# Patient Record
Sex: MALE | Race: WHITE | NOT HISPANIC OR LATINO | Employment: OTHER | URBAN - METROPOLITAN AREA
[De-identification: names, ages, dates, MRNs, and addresses within clinical notes are randomized per-mention and may not be internally consistent; named-entity substitution may affect disease eponyms.]

---

## 2022-01-04 ENCOUNTER — LAB VISIT (OUTPATIENT)
Dept: PRIMARY CARE CLINIC | Facility: OTHER | Age: 82
End: 2022-01-04
Attending: INTERNAL MEDICINE

## 2022-01-04 DIAGNOSIS — Z20.822 ENCOUNTER FOR LABORATORY TESTING FOR COVID-19 VIRUS: ICD-10-CM

## 2022-01-04 PROCEDURE — U0003 INFECTIOUS AGENT DETECTION BY NUCLEIC ACID (DNA OR RNA); SEVERE ACUTE RESPIRATORY SYNDROME CORONAVIRUS 2 (SARS-COV-2) (CORONAVIRUS DISEASE [COVID-19]), AMPLIFIED PROBE TECHNIQUE, MAKING USE OF HIGH THROUGHPUT TECHNOLOGIES AS DESCRIBED BY CMS-2020-01-R: HCPCS | Performed by: INTERNAL MEDICINE

## 2022-01-08 DIAGNOSIS — U07.1 COVID-19 VIRUS DETECTED: ICD-10-CM

## 2022-01-08 LAB
SARS-COV-2 RNA RESP QL NAA+PROBE: DETECTED
SARS-COV-2- CYCLE NUMBER: 29

## 2022-01-12 ENCOUNTER — LAB VISIT (OUTPATIENT)
Dept: PRIMARY CARE CLINIC | Facility: OTHER | Age: 82
End: 2022-01-12
Attending: INTERNAL MEDICINE
Payer: COMMERCIAL

## 2022-01-12 DIAGNOSIS — Z20.822 ENCOUNTER FOR LABORATORY TESTING FOR COVID-19 VIRUS: ICD-10-CM

## 2022-01-12 LAB
SARS-COV-2 RNA RESP QL NAA+PROBE: NOT DETECTED
SARS-COV-2- CYCLE NUMBER: NORMAL

## 2022-01-12 PROCEDURE — U0003 INFECTIOUS AGENT DETECTION BY NUCLEIC ACID (DNA OR RNA); SEVERE ACUTE RESPIRATORY SYNDROME CORONAVIRUS 2 (SARS-COV-2) (CORONAVIRUS DISEASE [COVID-19]), AMPLIFIED PROBE TECHNIQUE, MAKING USE OF HIGH THROUGHPUT TECHNOLOGIES AS DESCRIBED BY CMS-2020-01-R: HCPCS | Performed by: INTERNAL MEDICINE

## 2023-04-24 ENCOUNTER — PATIENT MESSAGE (OUTPATIENT)
Dept: CARDIOLOGY | Facility: CLINIC | Age: 83
End: 2023-04-24
Payer: MEDICARE

## 2023-08-31 ENCOUNTER — OFFICE VISIT (OUTPATIENT)
Dept: CARDIOLOGY | Facility: CLINIC | Age: 83
End: 2023-08-31
Payer: MEDICARE

## 2023-08-31 VITALS
OXYGEN SATURATION: 97 % | SYSTOLIC BLOOD PRESSURE: 116 MMHG | HEART RATE: 81 BPM | DIASTOLIC BLOOD PRESSURE: 64 MMHG | WEIGHT: 149 LBS

## 2023-08-31 DIAGNOSIS — Q21.12 PFO (PATENT FORAMEN OVALE): ICD-10-CM

## 2023-08-31 DIAGNOSIS — K21.9 GASTROESOPHAGEAL REFLUX DISEASE WITHOUT ESOPHAGITIS: ICD-10-CM

## 2023-08-31 DIAGNOSIS — R06.02 SOB (SHORTNESS OF BREATH): Primary | ICD-10-CM

## 2023-08-31 DIAGNOSIS — I45.10 RBBB: ICD-10-CM

## 2023-08-31 DIAGNOSIS — I10 PRIMARY HYPERTENSION: ICD-10-CM

## 2023-08-31 PROBLEM — E11.9 DIABETES MELLITUS TYPE II, CONTROLLED: Status: ACTIVE | Noted: 2023-08-31

## 2023-08-31 PROCEDURE — 93005 ELECTROCARDIOGRAM TRACING: CPT

## 2023-08-31 PROCEDURE — 99204 OFFICE O/P NEW MOD 45 MIN: CPT | Mod: S$PBB,,, | Performed by: INTERNAL MEDICINE

## 2023-08-31 PROCEDURE — 93010 EKG 12-LEAD: ICD-10-PCS | Mod: ,,, | Performed by: INTERNAL MEDICINE

## 2023-08-31 PROCEDURE — 99213 OFFICE O/P EST LOW 20 MIN: CPT | Mod: PBBFAC | Performed by: INTERNAL MEDICINE

## 2023-08-31 PROCEDURE — 99204 PR OFFICE/OUTPT VISIT, NEW, LEVL IV, 45-59 MIN: ICD-10-PCS | Mod: S$PBB,,, | Performed by: INTERNAL MEDICINE

## 2023-08-31 PROCEDURE — 93010 ELECTROCARDIOGRAM REPORT: CPT | Mod: ,,, | Performed by: INTERNAL MEDICINE

## 2023-08-31 PROCEDURE — 99999 PR PBB SHADOW E&M-EST. PATIENT-LVL III: ICD-10-PCS | Mod: PBBFAC,,, | Performed by: INTERNAL MEDICINE

## 2023-08-31 PROCEDURE — 99999 PR PBB SHADOW E&M-EST. PATIENT-LVL III: CPT | Mod: PBBFAC,,, | Performed by: INTERNAL MEDICINE

## 2023-08-31 RX ORDER — DOCUSATE SODIUM 100 MG/1
100 CAPSULE, LIQUID FILLED ORAL 2 TIMES DAILY
COMMUNITY

## 2023-08-31 RX ORDER — AMLODIPINE BESYLATE 10 MG/1
10 TABLET ORAL DAILY
COMMUNITY
Start: 2023-08-26

## 2023-08-31 RX ORDER — HYDROCHLOROTHIAZIDE 12.5 MG/1
12.5 CAPSULE ORAL
COMMUNITY

## 2023-08-31 RX ORDER — ASPIRIN 81 MG/1
81 TABLET ORAL DAILY
COMMUNITY

## 2023-08-31 RX ORDER — CARVEDILOL 6.25 MG/1
12.5 TABLET ORAL 2 TIMES DAILY
COMMUNITY
Start: 2023-06-27 | End: 2024-02-15

## 2023-08-31 RX ORDER — ATORVASTATIN CALCIUM 10 MG/1
10 TABLET, FILM COATED ORAL
COMMUNITY

## 2023-08-31 RX ORDER — BISACODYL 5 MG
5 TABLET, DELAYED RELEASE (ENTERIC COATED) ORAL DAILY
COMMUNITY

## 2023-08-31 RX ORDER — METFORMIN HYDROCHLORIDE 1000 MG/1
1000 TABLET ORAL DAILY
COMMUNITY
Start: 2023-08-26

## 2023-08-31 RX ORDER — INSULIN GLARGINE 100 [IU]/ML
INJECTION, SOLUTION SUBCUTANEOUS
COMMUNITY

## 2023-08-31 RX ORDER — FAMOTIDINE 40 MG/1
40 TABLET, FILM COATED ORAL DAILY
COMMUNITY
Start: 2023-08-26

## 2023-08-31 RX ORDER — LOSARTAN POTASSIUM 100 MG/1
100 TABLET ORAL 2 TIMES DAILY
COMMUNITY
Start: 2023-08-26 | End: 2024-02-15 | Stop reason: SDUPTHER

## 2023-08-31 NOTE — PROGRESS NOTES
Cardiology    8/31/2023  2:07 PM    Problem list  Patient Active Problem List   Diagnosis    Diabetes mellitus type II, controlled    PFO (patent foramen ovale)    Primary hypertension    SOB (shortness of breath)    Gastroesophageal reflux disease without esophagitis    RBBB       CC:  Follow-up    HPI:  Patient wanted to establish care.  Patient last saw cardiologist, Dr Lundberg at [facility] in 2017.  Patient has a history of PFO.  He has history of hypertension, diabetes.  He is still working 2 days a week.  He is active.  Any exertional symptoms.  He occasionally gets dizzy.  He only takes hydrochlorothiazide as needed for swelling or elevated blood pressure.  He states that his systolic blood pressure is 130s at home.    Medications  Current Outpatient Medications   Medication Sig Dispense Refill    amLODIPine (NORVASC) 10 MG tablet Take 10 mg by mouth once daily.      aspirin (ECOTRIN) 81 MG EC tablet 81 mg once daily.      atorvastatin (LIPITOR) 10 MG tablet 10 mg. Mon, Wed and Fri      bisacodyL (DULCOLAX, BISACODYL,) 5 mg EC tablet 5 mg once daily.      carvediloL (COREG) 6.25 MG tablet Take 6.25 mg by mouth 2 (two) times daily.      docusate sodium (COLACE) 100 MG capsule 100 mg 2 (two) times a day.      famotidine (PEPCID) 40 MG tablet Take 40 mg by mouth once daily.      hydroCHLOROthiazide (MICROZIDE) 12.5 mg capsule 12.5 mg as needed.      insulin (BASAGLAR KWIKPEN U-100 INSULIN) glargine 100 units/mL SubQ pen       losartan (COZAAR) 100 MG tablet Take 100 mg by mouth once daily.      metFORMIN (GLUCOPHAGE) 1000 MG tablet Take 1,000 mg by mouth once daily.      SITagliptin phosphate (JANUVIA) 100 MG Tab 100 mg once daily.       No current facility-administered medications for this visit.      Prior to Admission medications    Medication Sig Start Date End Date Taking? Authorizing Provider   amLODIPine (NORVASC) 10 MG tablet Take 10 mg by mouth once daily. 8/26/23  Yes Provider, Historical    aspirin (ECOTRIN) 81 MG EC tablet 81 mg once daily.   Yes Provider, Historical   atorvastatin (LIPITOR) 10 MG tablet 10 mg. Mon, Wed and Fri   Yes Provider, Historical   bisacodyL (DULCOLAX, BISACODYL,) 5 mg EC tablet 5 mg once daily.   Yes Provider, Historical   carvediloL (COREG) 6.25 MG tablet Take 6.25 mg by mouth 2 (two) times daily. 6/27/23  Yes Provider, Historical   docusate sodium (COLACE) 100 MG capsule 100 mg 2 (two) times a day.   Yes Provider, Historical   famotidine (PEPCID) 40 MG tablet Take 40 mg by mouth once daily. 8/26/23  Yes Provider, Historical   hydroCHLOROthiazide (MICROZIDE) 12.5 mg capsule 12.5 mg as needed.   Yes Provider, Historical   insulin (BASAGLAR KWIKPEN U-100 INSULIN) glargine 100 units/mL SubQ pen    Yes Provider, Historical   losartan (COZAAR) 100 MG tablet Take 100 mg by mouth once daily. 8/26/23  Yes Provider, Historical   metFORMIN (GLUCOPHAGE) 1000 MG tablet Take 1,000 mg by mouth once daily. 8/26/23  Yes Provider, Historical   SITagliptin phosphate (JANUVIA) 100 MG Tab 100 mg once daily.   Yes Provider, Historical         History  No past medical history on file.  No past surgical history on file.  Social History     Socioeconomic History    Marital status:    Tobacco Use    Smoking status: Never    Smokeless tobacco: Never   Substance and Sexual Activity    Alcohol use: Yes     Comment: occ    Drug use: Never         Allergies  Review of patient's allergies indicates:   Allergen Reactions    Ancef [cefazolin]     Cephalosporins     Flomax [tamsulosin]      wheezing    Valsartan      Headache           Review of Systems   Review of Systems   Constitutional: Negative for decreased appetite, fever and weight loss.   HENT:  Negative for congestion and nosebleeds.    Eyes:  Negative for double vision, vision loss in left eye, vision loss in right eye and visual disturbance.   Cardiovascular:  Negative for chest pain, claudication, cyanosis, dyspnea on exertion,  irregular heartbeat, leg swelling, near-syncope, orthopnea, palpitations, paroxysmal nocturnal dyspnea and syncope.   Respiratory:  Negative for cough, hemoptysis, shortness of breath, sleep disturbances due to breathing, snoring, sputum production and wheezing.    Endocrine: Negative for cold intolerance and heat intolerance.   Skin:  Negative for nail changes and rash.   Musculoskeletal:  Negative for joint pain, muscle cramps, muscle weakness and myalgias.   Gastrointestinal:  Negative for change in bowel habit, heartburn, hematemesis, hematochezia, hemorrhoids and melena.   Neurological:  Negative for dizziness, focal weakness and headaches.         Physical Exam  Wt Readings from Last 1 Encounters:   08/31/23 67.6 kg (149 lb)     BP Readings from Last 3 Encounters:   08/31/23 116/64     Pulse Readings from Last 1 Encounters:   08/31/23 81     There is no height or weight on file to calculate BMI.    Physical Exam  Vitals reviewed.   Constitutional:       Appearance: He is well-developed.   HENT:      Head: Atraumatic.   Eyes:      General: No scleral icterus.  Neck:      Vascular: Normal carotid pulses. No carotid bruit, hepatojugular reflux or JVD.   Cardiovascular:      Rate and Rhythm: Normal rate and regular rhythm.      Chest Wall: PMI is not displaced.      Pulses: Intact distal pulses.           Carotid pulses are 2+ on the right side and 2+ on the left side.       Radial pulses are 2+ on the right side and 2+ on the left side.        Dorsalis pedis pulses are 2+ on the right side and 2+ on the left side.      Heart sounds: Normal heart sounds, S1 normal and S2 normal. No murmur heard.     No friction rub.   Pulmonary:      Effort: Pulmonary effort is normal. No respiratory distress.      Breath sounds: Normal breath sounds. No stridor. No wheezing or rales.   Chest:      Chest wall: No tenderness.   Abdominal:      General: Bowel sounds are normal.      Palpations: Abdomen is soft.   Musculoskeletal:       Cervical back: Neck supple. No edema.   Skin:     General: Skin is warm and dry.      Nails: There is no clubbing.   Neurological:      Mental Status: He is alert and oriented to person, place, and time.   Psychiatric:         Behavior: Behavior normal.         Thought Content: Thought content normal.             Assessment  1. SOB (shortness of breath)  Controlled w inhaler    2. Primary hypertension  Controlled on meds, continue meds and monitor    3. PFO (patent foramen ovale)  stable    4. Gastroesophageal reflux disease without esophagitis  stable    5. RBBB  Unchanged, chronic since 2016        Plan and Discussion  Discussed his EKG today which showed normal sinus rhythm rate of 71 with right bundle branch block and LVH.  His orthostatic BP is negative today.  Given h/o of PFO, will get an echo to assess.    Follow Up  TBD      Noe Olivia MD, F.A.C.C, F.S.C.A.I.        40 minutes were spent in chart review, documentation and review of results, and evaluation, treatment, and counseling of patient on the same day of service.    Disclaimer: This document was created using voice recognition software ([employer] Fluency Direct). Although it may be edited, this document may contain errors related to incorrect recognition of the spoken word. Please call the physician if clarification is needed.

## 2023-10-16 ENCOUNTER — HOSPITAL ENCOUNTER (OUTPATIENT)
Dept: CARDIOLOGY | Facility: OTHER | Age: 83
Discharge: HOME | End: 2023-10-16
Attending: INTERNAL MEDICINE
Payer: MEDICARE

## 2023-10-16 VITALS
BODY MASS INDEX: 22.07 KG/M2 | SYSTOLIC BLOOD PRESSURE: 116 MMHG | HEART RATE: 81 BPM | DIASTOLIC BLOOD PRESSURE: 64 MMHG | HEIGHT: 69 IN | WEIGHT: 149 LBS

## 2023-10-16 DIAGNOSIS — I45.10 RBBB: ICD-10-CM

## 2023-10-16 LAB
ASCENDING AORTA: 3.19 CM
AV INDEX (PROSTH): 0.77
AV MEAN GRADIENT: 4 MMHG
AV PEAK GRADIENT: 7 MMHG
AV REGURGITATION PRESSURE HALF TIME: 537 MS
AV VALVE AREA BY VELOCITY RATIO: 2.45 CM²
AV VALVE AREA: 2.43 CM²
AV VELOCITY RATIO: 0.78
BSA FOR ECHO PROCEDURE: 1.81 M2
CV ECHO LV RWT: 0.41 CM
DOP CALC AO PEAK VEL: 1.36 M/S
DOP CALC AO VTI: 27.5 CM
DOP CALC LVOT AREA: 3.1 CM2
DOP CALC LVOT DIAMETER: 2 CM
DOP CALC LVOT PEAK VEL: 1.06 M/S
DOP CALC LVOT STROKE VOLUME: 66.88 CM3
DOP CALC RVOT PEAK VEL: 0.72 M/S
DOP CALC RVOT VTI: 12.5 CM
DOP CALCLVOT PEAK VEL VTI: 21.3 CM
E WAVE DECELERATION TIME: 314.82 MSEC
E/A RATIO: 0.54
E/E' RATIO: 7.85 M/S
ECHO LV POSTERIOR WALL: 1.01 CM (ref 0.6–1.1)
FRACTIONAL SHORTENING: 22 % (ref 28–44)
INTERVENTRICULAR SEPTUM: 1.05 CM (ref 0.6–1.1)
IVC DIAMETER: 1.16 CM
IVRT: 133.21 MSEC
LA MAJOR: 5.07 CM
LA MINOR: 5.52 CM
LA WIDTH: 3.3 CM
LEFT ATRIUM SIZE: 3.42 CM
LEFT ATRIUM VOLUME INDEX MOD: 27.7 ML/M2
LEFT ATRIUM VOLUME INDEX: 27.9 ML/M2
LEFT ATRIUM VOLUME MOD: 50.38 CM3
LEFT ATRIUM VOLUME: 50.7 CM3
LEFT INTERNAL DIMENSION IN SYSTOLE: 3.79 CM (ref 2.1–4)
LEFT VENTRICLE DIASTOLIC VOLUME INDEX: 61.31 ML/M2
LEFT VENTRICLE DIASTOLIC VOLUME: 111.59 ML
LEFT VENTRICLE MASS INDEX: 100 G/M2
LEFT VENTRICLE SYSTOLIC VOLUME INDEX: 33.8 ML/M2
LEFT VENTRICLE SYSTOLIC VOLUME: 61.44 ML
LEFT VENTRICULAR INTERNAL DIMENSION IN DIASTOLE: 4.88 CM (ref 3.5–6)
LEFT VENTRICULAR MASS: 182.01 G
LV LATERAL E/E' RATIO: 6.38 M/S
LV SEPTAL E/E' RATIO: 10.2 M/S
LVOT MG: 2.26 MMHG
LVOT MV: 0.71 CM/S
MV PEAK A VEL: 0.94 M/S
MV PEAK E VEL: 0.51 M/S
MV STENOSIS PRESSURE HALF TIME: 91.3 MS
MV VALVE AREA P 1/2 METHOD: 2.41 CM2
PISA AR MAX VEL: 4.15 M/S
PISA MRMAX VEL: 4.94 M/S
PISA TR MAX VEL: 2.09 M/S
PULM VEIN S/D RATIO: 1.81
PV MEAN GRADIENT: 1 MMHG
PV PEAK D VEL: 0.27 M/S
PV PEAK GRADIENT: 3 MMHG
PV PEAK S VEL: 0.49 M/S
PV PEAK VELOCITY: 0.9 M/S
RA MAJOR: 4.44 CM
RA WIDTH: 3.7 CM
RIGHT VENTRICULAR END-DIASTOLIC DIMENSION: 3.1 CM
RV TISSUE DOPPLER FREE WALL SYSTOLIC VELOCITY 1 (APICAL 4 CHAMBER VIEW): 14.11 CM/S
SINUS: 3.4 CM
STJ: 3.11 CM
TDI LATERAL: 0.08 M/S
TDI SEPTAL: 0.05 M/S
TDI: 0.07 M/S
TR MAX PG: 17 MMHG
TRICUSPID ANNULAR PLANE SYSTOLIC EXCURSION: 1.8 CM
Z-SCORE OF LEFT VENTRICULAR DIMENSION IN END DIASTOLE: -0.32
Z-SCORE OF LEFT VENTRICULAR DIMENSION IN END SYSTOLE: 1.56

## 2023-10-16 PROCEDURE — 93306 TTE W/DOPPLER COMPLETE: CPT | Mod: 26,,, | Performed by: INTERNAL MEDICINE

## 2023-10-16 PROCEDURE — 93306 TTE W/DOPPLER COMPLETE: CPT

## 2023-10-16 PROCEDURE — 93306 ECHO (CUPID ONLY): ICD-10-PCS | Mod: 26,,, | Performed by: INTERNAL MEDICINE

## 2023-10-27 ENCOUNTER — PATIENT MESSAGE (OUTPATIENT)
Dept: CARDIOLOGY | Facility: CLINIC | Age: 83
End: 2023-10-27
Payer: MEDICARE

## 2024-02-15 ENCOUNTER — OFFICE VISIT (OUTPATIENT)
Dept: CARDIOLOGY | Facility: CLINIC | Age: 84
End: 2024-02-15
Payer: MEDICARE

## 2024-02-15 VITALS
BODY MASS INDEX: 21.12 KG/M2 | SYSTOLIC BLOOD PRESSURE: 102 MMHG | DIASTOLIC BLOOD PRESSURE: 60 MMHG | OXYGEN SATURATION: 98 % | WEIGHT: 143 LBS | HEART RATE: 72 BPM

## 2024-02-15 DIAGNOSIS — I10 PRIMARY HYPERTENSION: Primary | ICD-10-CM

## 2024-02-15 DIAGNOSIS — Q21.12 PFO (PATENT FORAMEN OVALE): ICD-10-CM

## 2024-02-15 DIAGNOSIS — I45.10 RBBB: ICD-10-CM

## 2024-02-15 PROCEDURE — 99999 PR PBB SHADOW E&M-EST. PATIENT-LVL III: CPT | Mod: PBBFAC,,, | Performed by: INTERNAL MEDICINE

## 2024-02-15 PROCEDURE — 99213 OFFICE O/P EST LOW 20 MIN: CPT | Mod: PBBFAC | Performed by: INTERNAL MEDICINE

## 2024-02-15 PROCEDURE — 99214 OFFICE O/P EST MOD 30 MIN: CPT | Mod: S$PBB,,, | Performed by: INTERNAL MEDICINE

## 2024-02-15 RX ORDER — LOSARTAN POTASSIUM 100 MG/1
100 TABLET ORAL DAILY
Qty: 90 TABLET | Refills: 3 | Status: SHIPPED | OUTPATIENT
Start: 2024-02-15

## 2024-02-15 RX ORDER — CARVEDILOL 12.5 MG/1
12.5 TABLET ORAL 2 TIMES DAILY WITH MEALS
Qty: 180 TABLET | Refills: 3 | Status: SHIPPED | OUTPATIENT
Start: 2024-02-15 | End: 2025-02-14

## 2024-02-15 NOTE — PROGRESS NOTES
Cardiology    2/15/2024  11:44 AM    Problem list  Patient Active Problem List   Diagnosis    Diabetes mellitus type II, controlled    PFO (patent foramen ovale)    Primary hypertension    SOB (shortness of breath)    Gastroesophageal reflux disease without esophagitis    RBBB       CC:  Follow-up    HPI:  He has no complaints.  He denies any chest pain or shortness of breath.  He has been monitoring his blood pressure at home and noticed that his systolic has been elevated to 140s 150s so he decided to increase carvedilol to 12.5 mg twice daily and losartan 100 mg twice daily.    Medications  Current Outpatient Medications   Medication Sig Dispense Refill    amLODIPine (NORVASC) 10 MG tablet Take 10 mg by mouth once daily.      aspirin (ECOTRIN) 81 MG EC tablet 81 mg once daily.      atorvastatin (LIPITOR) 10 MG tablet 10 mg. Mon and Fri      bisacodyL (DULCOLAX, BISACODYL,) 5 mg EC tablet 5 mg once daily.      docusate sodium (COLACE) 100 MG capsule 100 mg 2 (two) times a day.      famotidine (PEPCID) 40 MG tablet Take 40 mg by mouth once daily.      hydroCHLOROthiazide (MICROZIDE) 12.5 mg capsule 12.5 mg as needed.      insulin (BASAGLAR KWIKPEN U-100 INSULIN) glargine 100 units/mL SubQ pen       metFORMIN (GLUCOPHAGE) 1000 MG tablet Take 1,000 mg by mouth once daily.      SITagliptin phosphate (JANUVIA) 100 MG Tab 100 mg once daily.      carvediloL (COREG) 12.5 MG tablet Take 1 tablet (12.5 mg total) by mouth 2 (two) times daily with meals. 180 tablet 3    losartan (COZAAR) 100 MG tablet Take 1 tablet (100 mg total) by mouth once daily. 90 tablet 3     No current facility-administered medications for this visit.      Prior to Admission medications    Medication Sig Start Date End Date Taking? Authorizing Provider   amLODIPine (NORVASC) 10 MG tablet Take 10 mg by mouth once daily. 8/26/23  Yes Provider, Historical   aspirin (ECOTRIN) 81 MG EC tablet 81 mg once daily.   Yes Provider, Historical    atorvastatin (LIPITOR) 10 MG tablet 10 mg. Mon and Fri   Yes Provider, Historical   bisacodyL (DULCOLAX, BISACODYL,) 5 mg EC tablet 5 mg once daily.   Yes Provider, Historical   docusate sodium (COLACE) 100 MG capsule 100 mg 2 (two) times a day.   Yes Provider, Historical   famotidine (PEPCID) 40 MG tablet Take 40 mg by mouth once daily. 8/26/23  Yes Provider, Historical   hydroCHLOROthiazide (MICROZIDE) 12.5 mg capsule 12.5 mg as needed.   Yes Provider, Historical   insulin (BASAGLAR KWIKPEN U-100 INSULIN) glargine 100 units/mL SubQ pen    Yes Provider, Historical   metFORMIN (GLUCOPHAGE) 1000 MG tablet Take 1,000 mg by mouth once daily. 8/26/23  Yes Provider, Historical   SITagliptin phosphate (JANUVIA) 100 MG Tab 100 mg once daily.   Yes Provider, Historical   carvediloL (COREG) 6.25 MG tablet Take 12.5 mg by mouth 2 (two) times daily. 6/27/23 2/15/24 Yes Provider, Historical   losartan (COZAAR) 100 MG tablet Take 100 mg by mouth 2 (two) times a day. 8/26/23 2/15/24 Yes Provider, Historical   carvediloL (COREG) 12.5 MG tablet Take 1 tablet (12.5 mg total) by mouth 2 (two) times daily with meals. 2/15/24 2/14/25  Noe Olivia MD   losartan (COZAAR) 100 MG tablet Take 1 tablet (100 mg total) by mouth once daily. 2/15/24   Noe Olivia MD         History  No past medical history on file.  No past surgical history on file.  Social History     Socioeconomic History    Marital status:    Tobacco Use    Smoking status: Never    Smokeless tobacco: Never   Substance and Sexual Activity    Alcohol use: Yes     Comment: occ    Drug use: Never         Allergies  Review of patient's allergies indicates:   Allergen Reactions    Ancef [cefazolin]     Cephalosporins     Flomax [tamsulosin]      wheezing    Valsartan      Headache           Review of Systems   Review of Systems   Constitutional: Negative for decreased appetite, fever and weight loss.   HENT:  Negative for congestion and nosebleeds.    Eyes:   Negative for double vision, vision loss in left eye, vision loss in right eye and visual disturbance.   Cardiovascular:  Negative for chest pain, claudication, cyanosis, dyspnea on exertion, irregular heartbeat, leg swelling, near-syncope, orthopnea, palpitations, paroxysmal nocturnal dyspnea and syncope.   Respiratory:  Negative for cough, hemoptysis, shortness of breath, sleep disturbances due to breathing, snoring, sputum production and wheezing.    Endocrine: Negative for cold intolerance and heat intolerance.   Skin:  Negative for nail changes and rash.   Musculoskeletal:  Negative for joint pain, muscle cramps, muscle weakness and myalgias.   Gastrointestinal:  Negative for change in bowel habit, heartburn, hematemesis, hematochezia, hemorrhoids and melena.   Neurological:  Negative for dizziness, focal weakness and headaches.         Physical Exam  Wt Readings from Last 1 Encounters:   02/15/24 64.9 kg (143 lb)     BP Readings from Last 3 Encounters:   02/15/24 102/60   10/16/23 116/64   08/31/23 116/64     Pulse Readings from Last 1 Encounters:   02/15/24 72     Body mass index is 21.12 kg/m².    Physical Exam  Vitals reviewed.   Constitutional:       Appearance: He is well-developed.   HENT:      Head: Atraumatic.   Eyes:      General: No scleral icterus.  Neck:      Vascular: Normal carotid pulses. No carotid bruit, hepatojugular reflux or JVD.   Cardiovascular:      Rate and Rhythm: Normal rate and regular rhythm.      Chest Wall: PMI is not displaced.      Pulses: Intact distal pulses.           Carotid pulses are 2+ on the right side and 2+ on the left side.       Radial pulses are 2+ on the right side and 2+ on the left side.        Dorsalis pedis pulses are 2+ on the right side and 2+ on the left side.      Heart sounds: Normal heart sounds, S1 normal and S2 normal. No murmur heard.     No friction rub.   Pulmonary:      Effort: Pulmonary effort is normal. No respiratory distress.      Breath sounds:  Normal breath sounds. No stridor. No wheezing or rales.   Chest:      Chest wall: No tenderness.   Abdominal:      General: Bowel sounds are normal.      Palpations: Abdomen is soft.   Musculoskeletal:      Cervical back: Neck supple. No edema.   Skin:     General: Skin is warm and dry.      Nails: There is no clubbing.   Neurological:      Mental Status: He is alert and oriented to person, place, and time.   Psychiatric:         Behavior: Behavior normal.         Thought Content: Thought content normal.             Assessment  1. Primary hypertension  Well controlled    2. RBBB  Stable    3. PFO (patent foramen ovale)  Stable        Plan and Discussion  Discussed that his blood pressure is well controlled.  However he should only take losartan 100 mg once daily.  He can continue carvedilol 12.5 mg twice daily.  He can continue amlodipine.  Refills for losartan carvedilol sent.  He will bring his blood pressure cuff in for us to validate.    Follow Up  Six-month      Noe Olivia MD, F.A.C.C, F.S.C.A.I.      Total professional time spent for the encounter: 30 minutes  Time was spent preparing to see the patient, reviewing results of prior testing, obtaining and/or reviewing separately obtained history, performing a medically appropriate examination and interview, counseling and educating the patient/family, ordering medications/tests/procedures, referring and communicating with other health care professionals, documenting clinical information in the electronic health record, and independently interpreting results.    Disclaimer: This document was created using voice recognition software ([employer] Fluency Direct). Although it may be edited, this document may contain errors related to incorrect recognition of the spoken word. Please call the physician if clarification is needed.

## 2024-02-22 ENCOUNTER — TELEPHONE (OUTPATIENT)
Dept: CARDIOLOGY | Facility: CLINIC | Age: 84
End: 2024-02-22
Payer: MEDICARE

## 2024-02-22 NOTE — TELEPHONE ENCOUNTER
----- Message from Amelia Abigailsania Steele sent at 2/22/2024  2:13 PM CST -----  Regarding: call back  Type: Patient Call Back    Who called: pt    What is the request in detail: requesting provider to do consult with endocrinologist Krista Rodríguez, fax number [phone]    Can the clinic reply by [facility]?no    Would the patient rather a call back or a response via [facility]? call    Best call back number: [phone]     Additional Information:

## 2024-03-06 ENCOUNTER — PATIENT MESSAGE (OUTPATIENT)
Dept: CARDIOLOGY | Facility: CLINIC | Age: 84
End: 2024-03-06
Payer: MEDICARE

## 2024-08-02 DIAGNOSIS — R80.9 ALBUMINURIA: Primary | ICD-10-CM

## 2024-08-07 ENCOUNTER — LAB VISIT (OUTPATIENT)
Dept: LAB | Facility: HOSPITAL | Age: 84
End: 2024-08-07
Payer: MEDICARE

## 2024-08-07 DIAGNOSIS — R80.9 ALBUMINURIA: ICD-10-CM

## 2024-08-07 LAB
ALBUMIN SERPL BCP-MCNC: 3.6 G/DL (ref 3.5–5.2)
ANION GAP SERPL CALC-SCNC: 7 MMOL/L (ref 8–16)
BASOPHILS # BLD AUTO: 0.1 K/UL (ref 0–0.2)
BASOPHILS NFR BLD: 1 % (ref 0–1.9)
BUN SERPL-MCNC: 15 MG/DL (ref 8–23)
CALCIUM SERPL-MCNC: 9 MG/DL (ref 8.7–10.5)
CHLORIDE SERPL-SCNC: 104 MMOL/L (ref 95–110)
CO2 SERPL-SCNC: 23 MMOL/L (ref 23–29)
CREAT SERPL-MCNC: 1.2 MG/DL (ref 0.5–1.4)
DIFFERENTIAL METHOD BLD: ABNORMAL
EOSINOPHIL # BLD AUTO: 0.7 K/UL (ref 0–0.5)
EOSINOPHIL NFR BLD: 7.8 % (ref 0–8)
ERYTHROCYTE [DISTWIDTH] IN BLOOD BY AUTOMATED COUNT: 14.3 % (ref 11.5–14.5)
EST. GFR  (NO RACE VARIABLE): 59.6 ML/MIN/1.73 M^2
GLUCOSE SERPL-MCNC: 167 MG/DL (ref 70–110)
HCT VFR BLD AUTO: 39.8 % (ref 40–54)
HGB BLD-MCNC: 12.6 G/DL (ref 14–18)
IMM GRANULOCYTES # BLD AUTO: 0.03 K/UL (ref 0–0.04)
IMM GRANULOCYTES NFR BLD AUTO: 0.3 % (ref 0–0.5)
LYMPHOCYTES # BLD AUTO: 2 K/UL (ref 1–4.8)
LYMPHOCYTES NFR BLD: 21.4 % (ref 18–48)
MCH RBC QN AUTO: 26.9 PG (ref 27–31)
MCHC RBC AUTO-ENTMCNC: 31.7 G/DL (ref 32–36)
MCV RBC AUTO: 85 FL (ref 82–98)
MONOCYTES # BLD AUTO: 1 K/UL (ref 0.3–1)
MONOCYTES NFR BLD: 10.8 % (ref 4–15)
NEUTROPHILS # BLD AUTO: 5.6 K/UL (ref 1.8–7.7)
NEUTROPHILS NFR BLD: 58.7 % (ref 38–73)
NRBC BLD-RTO: 0 /100 WBC
PHOSPHATE SERPL-MCNC: 3.7 MG/DL (ref 2.7–4.5)
PLATELET # BLD AUTO: 225 K/UL (ref 150–450)
PMV BLD AUTO: 9.9 FL (ref 9.2–12.9)
POTASSIUM SERPL-SCNC: 4.4 MMOL/L (ref 3.5–5.1)
PTH-INTACT SERPL-MCNC: 51.8 PG/ML (ref 9–77)
RBC # BLD AUTO: 4.68 M/UL (ref 4.6–6.2)
SODIUM SERPL-SCNC: 134 MMOL/L (ref 136–145)
URATE SERPL-MCNC: 4.1 MG/DL (ref 3.4–7)
WBC # BLD AUTO: 9.53 K/UL (ref 3.9–12.7)

## 2024-08-07 PROCEDURE — 84550 ASSAY OF BLOOD/URIC ACID: CPT | Performed by: INTERNAL MEDICINE

## 2024-08-07 PROCEDURE — 85025 COMPLETE CBC W/AUTO DIFF WBC: CPT | Performed by: INTERNAL MEDICINE

## 2024-08-07 PROCEDURE — 83970 ASSAY OF PARATHORMONE: CPT | Performed by: INTERNAL MEDICINE

## 2024-08-07 PROCEDURE — 36415 COLL VENOUS BLD VENIPUNCTURE: CPT | Mod: PO | Performed by: INTERNAL MEDICINE

## 2024-08-07 PROCEDURE — 80069 RENAL FUNCTION PANEL: CPT | Performed by: INTERNAL MEDICINE

## 2024-08-14 NOTE — PROGRESS NOTES
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 Prior labs at [facility]. His baseline Cr appears to be 0.9-1.0 with GFR > 60%; last at baseline 3/2024. He first noted microalbuminuria in 1/2024. ACR 39 on 1/12/24 and ACR 43 in 4/11/24.   Labs also with intermittent hyponatremia since at least 2016; Na 134 today.     He was diagnosed with T2DM in the 1990s. Last A1c was 7.7. Tracks his glucose levels which range from 50-upper 200s. He notes nocturnal hypoglycemia. He has never been on a SGLT2i.   He reports good water intake; 2-3 bottles per day. He is prescribed HCTZ PRN for edema, but hasn't taken in several months. No edema on exam.         Past Medical History:   Diagnosis Date    HTN (hypertension)     Patent foramen ovale     T2DM (type 2 diabetes mellitus)        Past Surgical History:   Procedure Laterality Date    OPEN REDUCTION AND INTERNAL FIXATION (ORIF) OF INJURY OF SHOULDER  2016       No family history on file.    Social History     Tobacco Use    Smoking status: Never    Smokeless tobacco: Never   Substance Use Topics    Alcohol use: Yes     Comment: occ    Drug use: Never         ROS:  Complete ROS otherwise negative except as indicated above.         Current Outpatient Medications:     amLODIPine (NORVASC) 10 MG tablet, Take 10 mg by mouth once daily., Disp: , Rfl:     aspirin (ECOTRIN) 81 MG EC tablet, 81 mg once daily., Disp: , Rfl:     atorvastatin (LIPITOR) 10 MG tablet, 10 mg. Mon and Fri, Disp: , Rfl:     bisacodyL (DULCOLAX, BISACODYL,) 5 mg EC tablet, 5 mg once daily., Disp: , Rfl:     carvediloL (COREG) 12.5 MG tablet, Take 1 tablet (12.5 mg total) by mouth 2 (two) times daily with meals., Disp: 180 tablet,  "Rfl: 3    docusate sodium (COLACE) 100 MG capsule, 100 mg 2 (two) times a day., Disp: , Rfl:     ergocalciferol (ERGOCALCIFEROL) 50,000 unit Cap, Take 50,000 Units by mouth every 7 days., Disp: , Rfl:     famotidine (PEPCID) 40 MG tablet, Take 40 mg by mouth once daily., Disp: , Rfl:     insulin (BASAGLAR KWIKPEN U-100 INSULIN) glargine 100 units/mL SubQ pen, , Disp: , Rfl:     losartan (COZAAR) 100 MG tablet, Take 1 tablet (100 mg total) by mouth once daily., Disp: 90 tablet, Rfl: 3    metFORMIN (GLUCOPHAGE) 1000 MG tablet, Take 1,000 mg by mouth once daily., Disp: , Rfl:     SITagliptin phosphate (JANUVIA) 100 MG Tab, 100 mg once daily., Disp: , Rfl:       Vitals: Blood pressure 110/72, pulse 81, resp. rate 18, height 5' 9" (1.753 m), weight 64.4 kg (141 lb 15.6 oz), SpO2 97%. Body mass index is 20.97 kg/m².    Physical Exam  Vitals reviewed.   Constitutional:       General: He is awake. He is not in acute distress.     Appearance: Normal appearance. He is well-developed.   HENT:      Head: Normocephalic and atraumatic.      Nose: Nose normal.      Mouth/Throat:      Mouth: Mucous membranes are moist.   Eyes:      Extraocular Movements: Extraocular movements intact.      Conjunctiva/sclera: Conjunctivae normal.   Pulmonary:      Effort: Pulmonary effort is normal.   Musculoskeletal:         General: No tenderness or signs of injury.      Right lower leg: No edema.      Left lower leg: No edema.   Skin:     General: Skin is warm and dry.      Findings: No erythema or rash.   Neurological:      General: No focal deficit present.      Mental Status: He is alert. Mental status is at baseline.   Psychiatric:         Mood and Affect: Mood normal.         Behavior: Behavior normal.           Labs/Imaging:  Sodium   Date Value Ref Range Status   08/07/2024 134 (L) 136 - 145 mmol/L Final     Potassium   Date Value Ref Range Status   08/07/2024 4.4 3.5 - 5.1 mmol/L Final     Chloride   Date Value Ref Range Status "   08/07/2024 104 95 - 110 mmol/L Final     CO2   Date Value Ref Range Status   08/07/2024 23 23 - 29 mmol/L Final     BUN   Date Value Ref Range Status   08/07/2024 15 8 - 23 mg/dL Final     Creatinine   Date Value Ref Range Status   08/07/2024 1.2 0.5 - 1.4 mg/dL Final     eGFR   Date Value Ref Range Status   08/07/2024 59.6 (A) >60 mL/min/1.73 m^2 Final     Calcium   Date Value Ref Range Status   08/07/2024 9.0 8.7 - 10.5 mg/dL Final     Phosphorus   Date Value Ref Range Status   08/07/2024 3.7 2.7 - 4.5 mg/dL Final     Albumin   Date Value Ref Range Status   08/07/2024 3.6 3.5 - 5.2 g/dL Final       PTH, Intact   Date Value Ref Range Status   08/07/2024 51.8 9.0 - 77.0 pg/mL Final     Uric Acid   Date Value Ref Range Status   08/07/2024 4.1 3.4 - 7.0 mg/dL Final       Hemoglobin   Date Value Ref Range Status   08/07/2024 12.6 (L) 14.0 - 18.0 g/dL Final       Prot/Creat Ratio, Urine   Date Value Ref Range Status   08/07/2024 0.16 0.00 - 0.20 Final       4/11/24: ACR 43  1/12/24: Cr 0.94, GFR 80, Na 139. A1c 7.7. ACR 39.   3/2/23: Cr 1.01, GFR 74%        Renal US: ordered       Impression/Plan:    Diabetic nephropathy associated with type 2 diabetes mellitus  - ACR 39 in 1/2024 --> 43 in 4/2024; due to diabetic nephropathy  - UPCR negative; controlled  - continue max dose losartan   - considered adding SGLT2i to regimen, but concerned about nocturnal hypoglycemia. Will defer to PCP  - discussed importance of keeping A1c controlled.     Stage 3a chronic kidney disease  - baseline Cr 0.9-1.0 with GFR 70-80s; due to diabetic nephropathy  - Cr 1.2 today; mildly above baseline.   - recommended increasing water intake. Work on improving A1c. Avoid NSAIDs  - ordered renal US    Hyponatremia  - intermittently present since 2016  - Na 134 today; stable  - patient to start keeping track of fluid intake so that we can adjust at next visit  - ordered SOsm, UOsm, Haile  - requested f/u in 3 months    Anemia  - hgb 12.6; at goal  for CKD. Will trend.       Visit today included increased complexity associated with the care of the episodic problem diabetic nephropathy addressed and managing the longitudinal care of the patient due to the serious and/or complex managed problem(s) CKD.      Treatment options and plan were discussed with the patient and/or caregiver.   RTC 3-4 months.       Emili Torres MD  [facility]  [phone]

## 2024-08-15 ENCOUNTER — OFFICE VISIT (OUTPATIENT)
Dept: NEPHROLOGY | Facility: CLINIC | Age: 84
End: 2024-08-15
Payer: MEDICARE

## 2024-08-15 VITALS
RESPIRATION RATE: 18 BRPM | DIASTOLIC BLOOD PRESSURE: 72 MMHG | SYSTOLIC BLOOD PRESSURE: 110 MMHG | WEIGHT: 142 LBS | BODY MASS INDEX: 21.03 KG/M2 | HEART RATE: 81 BPM | OXYGEN SATURATION: 97 % | HEIGHT: 69 IN

## 2024-08-15 DIAGNOSIS — E11.21 DIABETIC NEPHROPATHY ASSOCIATED WITH TYPE 2 DIABETES MELLITUS: Primary | ICD-10-CM

## 2024-08-15 DIAGNOSIS — E87.1 HYPONATREMIA: ICD-10-CM

## 2024-08-15 DIAGNOSIS — D64.9 ANEMIA, UNSPECIFIED TYPE: ICD-10-CM

## 2024-08-15 DIAGNOSIS — N18.31 STAGE 3A CHRONIC KIDNEY DISEASE: ICD-10-CM

## 2024-08-15 PROBLEM — R80.9 ALBUMINURIA: Status: ACTIVE | Noted: 2024-08-15

## 2024-08-15 PROCEDURE — 99214 OFFICE O/P EST MOD 30 MIN: CPT | Mod: PBBFAC | Performed by: INTERNAL MEDICINE

## 2024-08-15 PROCEDURE — 99999 PR PBB SHADOW E&M-EST. PATIENT-LVL IV: CPT | Mod: PBBFAC,,, | Performed by: INTERNAL MEDICINE

## 2024-08-15 RX ORDER — ERGOCALCIFEROL 1.25 MG/1
50000 CAPSULE ORAL
COMMUNITY
Start: 2024-05-18

## 2024-08-15 NOTE — PATIENT INSTRUCTIONS
The albumin in your urine is likely due to longstanding diabetes. The good thing is that it is a very small amount, so should not be causing any issues. Continue losartan. If the albuminuria worsens, there are other medications we can add.     Goal water intake is 48-64oz per day.     The best thing you can do for your kidneys is control your diabetes. Try to get your A1c down to 6.5 or less.      Please avoid all NSAIDs. This group of medications includes ibuprofen, Motrin, Aleve, Advil, naproxen, meloxicam (Mobic), ketorolac (Toradol), BC powders, Goody powders, etc.   In the short term, these medications can cause the small blood vessels in your kidneys to constrict, resulting in decreased blood flow to your kidneys as well as worsening of hypertension and swelling. In the long term, these medications can result in permanent damage to your kidneys.

## 2024-08-15 NOTE — ASSESSMENT & PLAN NOTE
- intermittently present since 2016  - Na 134 today; stable  - patient to start keeping track of fluid intake so that we can adjust at next visit  - ordered SOsm, UOsm, Haile  - requested f/u in 3 months

## 2024-08-15 NOTE — ASSESSMENT & PLAN NOTE
- baseline Cr 0.9-1.0 with GFR 70-80s; due to diabetic nephropathy  - Cr 1.2 today; mildly above baseline.   - recommended increasing water intake. Work on improving A1c. Avoid NSAIDs  - ordered renal US   Goal Outcome Evaluation:  Plan of Care Reviewed With: patient        Progress: no change  Outcome Evaluation: Pt has slept on and off tonight, wife remains at bedside, pt turned throughout the night, no new complaints at this time, pt to possibly d/c home with wife

## 2024-08-15 NOTE — ASSESSMENT & PLAN NOTE
- ACR 39 in 1/2024 --> 43 in 4/2024; due to diabetic nephropathy  - UPCR negative; controlled  - continue max dose losartan   - considered adding SGLT2i to regimen, but concerned about nocturnal hypoglycemia. Will defer to PCP  - discussed importance of keeping A1c controlled.

## 2024-10-30 ENCOUNTER — PATIENT MESSAGE (OUTPATIENT)
Dept: NEPHROLOGY | Facility: CLINIC | Age: 84
End: 2024-10-30
Payer: MEDICARE

## 2024-11-06 ENCOUNTER — OFFICE VISIT (OUTPATIENT)
Dept: CARDIOLOGY | Facility: CLINIC | Age: 84
End: 2024-11-06
Payer: MEDICARE

## 2024-11-06 VITALS
HEART RATE: 80 BPM | OXYGEN SATURATION: 97 % | WEIGHT: 145 LBS | RESPIRATION RATE: 12 BRPM | HEIGHT: 69 IN | SYSTOLIC BLOOD PRESSURE: 124 MMHG | BODY MASS INDEX: 21.48 KG/M2 | DIASTOLIC BLOOD PRESSURE: 60 MMHG

## 2024-11-06 DIAGNOSIS — E11.21 DIABETIC NEPHROPATHY ASSOCIATED WITH TYPE 2 DIABETES MELLITUS: ICD-10-CM

## 2024-11-06 DIAGNOSIS — R06.00 DYSPNEA, UNSPECIFIED TYPE: ICD-10-CM

## 2024-11-06 DIAGNOSIS — I45.10 RBBB: ICD-10-CM

## 2024-11-06 DIAGNOSIS — Q21.12 PFO (PATENT FORAMEN OVALE): Primary | ICD-10-CM

## 2024-11-06 DIAGNOSIS — I10 PRIMARY HYPERTENSION: ICD-10-CM

## 2024-11-06 PROCEDURE — 99213 OFFICE O/P EST LOW 20 MIN: CPT | Mod: PBBFAC | Performed by: INTERNAL MEDICINE

## 2024-11-06 PROCEDURE — 99999 PR PBB SHADOW E&M-EST. PATIENT-LVL III: CPT | Mod: PBBFAC,,, | Performed by: INTERNAL MEDICINE

## 2024-11-06 PROCEDURE — 99214 OFFICE O/P EST MOD 30 MIN: CPT | Mod: S$PBB,,, | Performed by: INTERNAL MEDICINE

## 2024-11-06 NOTE — PROGRESS NOTES
Cardiology    11/6/2024  2:52 PM    Problem list  Patient Active Problem List   Diagnosis    Diabetes mellitus type II, controlled    PFO (patent foramen ovale)    Primary hypertension    SOB (shortness of breath)    Gastroesophageal reflux disease without esophagitis    RBBB    Diabetic nephropathy associated with type 2 diabetes mellitus    Stage 3a chronic kidney disease    Hyponatremia    Anemia       CC:  F/u    HPI:  He is here for his six-month follow-up.  He reports shortness breath and dyspnea on exertion.  He denies any chest pain.  He walks on a treadmill for 15 minutes.  He also walks in the park for a mi. he has no problems walking on the treadmill.  His blood pressure is well controlled.    Medications  Current Outpatient Medications   Medication Sig Dispense Refill    amLODIPine (NORVASC) 10 MG tablet Take 10 mg by mouth once daily.      aspirin (ECOTRIN) 81 MG EC tablet 81 mg once daily.      atorvastatin (LIPITOR) 10 MG tablet 10 mg. Mon and Fri      bisacodyL (DULCOLAX, BISACODYL,) 5 mg EC tablet 5 mg once daily.      carvediloL (COREG) 12.5 MG tablet Take 1 tablet (12.5 mg total) by mouth 2 (two) times daily with meals. 180 tablet 3    docusate sodium (COLACE) 100 MG capsule 100 mg 2 (two) times a day.      ergocalciferol (ERGOCALCIFEROL) 50,000 unit Cap Take 50,000 Units by mouth every 7 days.      famotidine (PEPCID) 40 MG tablet Take 40 mg by mouth once daily.      insulin (BASAGLAR KWIKPEN U-100 INSULIN) glargine 100 units/mL SubQ pen       losartan (COZAAR) 100 MG tablet Take 1 tablet (100 mg total) by mouth once daily. 90 tablet 3    metFORMIN (GLUCOPHAGE) 1000 MG tablet Take 1,000 mg by mouth once daily.      SITagliptin phosphate (JANUVIA) 100 MG Tab 100 mg once daily.       No current facility-administered medications for this visit.      Prior to Admission medications    Medication Sig Start Date End Date Taking? Authorizing Provider   amLODIPine (NORVASC) 10 MG tablet Take 10 mg  by mouth once daily. 8/26/23  Yes Provider, Historical   aspirin (ECOTRIN) 81 MG EC tablet 81 mg once daily.   Yes Provider, Historical   atorvastatin (LIPITOR) 10 MG tablet 10 mg. Mon and Fri   Yes Provider, Historical   bisacodyL (DULCOLAX, BISACODYL,) 5 mg EC tablet 5 mg once daily.   Yes Provider, Historical   carvediloL (COREG) 12.5 MG tablet Take 1 tablet (12.5 mg total) by mouth 2 (two) times daily with meals. 2/15/24 2/14/25 Yes Noe Olivia MD   docusate sodium (COLACE) 100 MG capsule 100 mg 2 (two) times a day.   Yes Provider, Historical   ergocalciferol (ERGOCALCIFEROL) 50,000 unit Cap Take 50,000 Units by mouth every 7 days. 5/18/24  Yes Provider, Historical   famotidine (PEPCID) 40 MG tablet Take 40 mg by mouth once daily. 8/26/23  Yes Provider, Historical   insulin (BASAGLAR KWIKPEN U-100 INSULIN) glargine 100 units/mL SubQ pen    Yes Provider, Historical   losartan (COZAAR) 100 MG tablet Take 1 tablet (100 mg total) by mouth once daily. 2/15/24  Yes Noe Olivia MD   metFORMIN (GLUCOPHAGE) 1000 MG tablet Take 1,000 mg by mouth once daily. 8/26/23  Yes Provider, Historical   SITagliptin phosphate (JANUVIA) 100 MG Tab 100 mg once daily.   Yes Provider, Historical         History  Past Medical History:   Diagnosis Date    HTN (hypertension)     Patent foramen ovale     T2DM (type 2 diabetes mellitus)      Past Surgical History:   Procedure Laterality Date    OPEN REDUCTION AND INTERNAL FIXATION (ORIF) OF INJURY OF SHOULDER  2016     Social History     Socioeconomic History    Marital status:    Tobacco Use    Smoking status: Never    Smokeless tobacco: Never   Substance and Sexual Activity    Alcohol use: Yes     Comment: occ    Drug use: Never         Allergies  Review of patient's allergies indicates:   Allergen Reactions    Ancef [cefazolin]     Cephalosporins     Flomax [tamsulosin]      wheezing    Valsartan      Headache           Review of Systems   Review of Systems    Constitutional: Negative for decreased appetite, fever and weight loss.   HENT:  Negative for congestion and nosebleeds.    Eyes:  Negative for double vision, vision loss in left eye, vision loss in right eye and visual disturbance.   Cardiovascular:  Negative for chest pain, claudication, cyanosis, dyspnea on exertion, irregular heartbeat, leg swelling, near-syncope, orthopnea, palpitations, paroxysmal nocturnal dyspnea and syncope.   Respiratory:  Negative for cough, hemoptysis, shortness of breath, sleep disturbances due to breathing, snoring, sputum production and wheezing.    Endocrine: Negative for cold intolerance and heat intolerance.   Skin:  Negative for nail changes and rash.   Musculoskeletal:  Negative for joint pain, muscle cramps, muscle weakness and myalgias.   Gastrointestinal:  Negative for change in bowel habit, heartburn, hematemesis, hematochezia, hemorrhoids and melena.   Neurological:  Negative for dizziness, focal weakness and headaches.         Physical Exam  Wt Readings from Last 1 Encounters:   11/06/24 65.8 kg (145 lb)     BP Readings from Last 3 Encounters:   11/06/24 124/60   08/15/24 110/72   02/15/24 102/60     Pulse Readings from Last 1 Encounters:   11/06/24 80     Body mass index is 21.41 kg/m².    Physical Exam  Vitals reviewed.   Constitutional:       Appearance: He is well-developed.   HENT:      Head: Atraumatic.   Eyes:      General: No scleral icterus.  Neck:      Vascular: Normal carotid pulses. No carotid bruit, hepatojugular reflux or JVD.   Cardiovascular:      Rate and Rhythm: Normal rate and regular rhythm.      Chest Wall: PMI is not displaced.      Pulses: Intact distal pulses.           Carotid pulses are 2+ on the right side and 2+ on the left side.       Radial pulses are 2+ on the right side and 2+ on the left side.        Dorsalis pedis pulses are 2+ on the right side and 2+ on the left side.      Heart sounds: Normal heart sounds, S1 normal and S2 normal. No  murmur heard.     No friction rub.   Pulmonary:      Effort: Pulmonary effort is normal. No respiratory distress.      Breath sounds: Normal breath sounds. No stridor. No wheezing or rales.   Chest:      Chest wall: No tenderness.   Abdominal:      General: Bowel sounds are normal.      Palpations: Abdomen is soft.   Musculoskeletal:      Cervical back: Neck supple. No edema.   Skin:     General: Skin is warm and dry.      Nails: There is no clubbing.   Neurological:      Mental Status: He is alert and oriented to person, place, and time.   Psychiatric:         Behavior: Behavior normal.         Thought Content: Thought content normal.             Assessment  1. PFO (patent foramen ovale) (Primary)  Stable.  Continue to monitor and evaluate assess and treat if needed  - Echo; Future    2. Primary hypertension  Well controlled.  Continue to monitor assess evaluate and treat with blood pressure medication    3. RBBB  Stable    4. Diabetic nephropathy associated with type 2 diabetes mellitus  Unchanged  - Ambulatory referral/consult to Endocrinology; Future    5. Dyspnea, unspecified type  Being evaluated  - Complete PFT with bronchodilator; Future  - Echo; Future        Plan and Discussion  Discussed his EKG today which showed normal sinus rhythm rate of 73 with incomplete right bundle-branch block which is stable.  He has dry crackles on the right side of his lungs.  Will get PFTs.  Will get an echocardiogram to reassess his ejection fraction given his depressed LV function last year.  Refer to endocrinology since his endocrinologist has retired.    Follow Up  3-4 months      Noe Olivia MD, F.A.C.C, F.S.C.A.I.      Total professional time spent for the encounter: 30 minutes  Time was spent preparing to see the patient, reviewing results of prior testing, obtaining and/or reviewing separately obtained history, performing a medically appropriate examination and interview, counseling and educating the  patient/family, ordering medications/tests/procedures, referring and communicating with other health care professionals, documenting clinical information in the electronic health record, and independently interpreting results.    Disclaimer: This document was created using voice recognition software (Snaps*[employer]). Although it may be edited, this document may contain errors related to incorrect recognition of the spoken word. Please call the physician if clarification is needed.

## 2024-11-13 ENCOUNTER — PATIENT MESSAGE (OUTPATIENT)
Dept: NEPHROLOGY | Facility: CLINIC | Age: 84
End: 2024-11-13
Payer: MEDICARE

## 2024-11-14 ENCOUNTER — OFFICE VISIT (OUTPATIENT)
Dept: ENDOCRINOLOGY | Facility: CLINIC | Age: 84
End: 2024-11-14
Payer: MEDICARE

## 2024-11-14 VITALS
WEIGHT: 144.38 LBS | SYSTOLIC BLOOD PRESSURE: 116 MMHG | DIASTOLIC BLOOD PRESSURE: 78 MMHG | HEART RATE: 75 BPM | BODY MASS INDEX: 21.32 KG/M2

## 2024-11-14 DIAGNOSIS — I10 PRIMARY HYPERTENSION: ICD-10-CM

## 2024-11-14 DIAGNOSIS — E11.65 TYPE 2 DIABETES MELLITUS WITH HYPERGLYCEMIA, WITH LONG-TERM CURRENT USE OF INSULIN: Primary | ICD-10-CM

## 2024-11-14 DIAGNOSIS — E11.649 HYPOGLYCEMIA ASSOCIATED WITH TYPE 2 DIABETES MELLITUS: ICD-10-CM

## 2024-11-14 DIAGNOSIS — Z79.4 TYPE 2 DIABETES MELLITUS WITH HYPERGLYCEMIA, WITH LONG-TERM CURRENT USE OF INSULIN: Primary | ICD-10-CM

## 2024-11-14 DIAGNOSIS — E11.21 DIABETIC NEPHROPATHY ASSOCIATED WITH TYPE 2 DIABETES MELLITUS: ICD-10-CM

## 2024-11-14 DIAGNOSIS — E11.65 TYPE 2 DIABETES MELLITUS WITH HYPERGLYCEMIA, WITHOUT LONG-TERM CURRENT USE OF INSULIN: ICD-10-CM

## 2024-11-14 PROCEDURE — 99214 OFFICE O/P EST MOD 30 MIN: CPT | Mod: PBBFAC | Performed by: HOSPITALIST

## 2024-11-14 PROCEDURE — 99999 PR PBB SHADOW E&M-EST. PATIENT-LVL IV: CPT | Mod: PBBFAC,,, | Performed by: HOSPITALIST

## 2024-11-14 RX ORDER — INSULIN GLARGINE 100 [IU]/ML
8 INJECTION, SOLUTION SUBCUTANEOUS NIGHTLY
Qty: 15 ML | Refills: 3 | Status: SHIPPED | OUTPATIENT
Start: 2024-11-14

## 2024-11-14 RX ORDER — METFORMIN HYDROCHLORIDE 1000 MG/1
1000 TABLET ORAL 2 TIMES DAILY WITH MEALS
Qty: 90 TABLET | Refills: 3 | Status: SHIPPED | OUTPATIENT
Start: 2024-11-14

## 2024-11-14 RX ORDER — PEN NEEDLE, DIABETIC 30 GX3/16"
NEEDLE, DISPOSABLE MISCELLANEOUS
Qty: 100 EACH | Refills: 5 | Status: SHIPPED | OUTPATIENT
Start: 2024-11-14

## 2024-11-14 RX ORDER — HYDROCHLOROTHIAZIDE 12.5 MG/1
CAPSULE ORAL
Qty: 2 EACH | Refills: 11 | Status: SHIPPED | OUTPATIENT
Start: 2024-11-14

## 2024-11-14 NOTE — ASSESSMENT & PLAN NOTE
- Diabetes is AT GOAL, as indicated by the most recent A1C reviewed on 11/14/2024.  COMPLICATED BY FREQUENT OVERNIGHT HYPOGLYCEMIA, VERY CONCERNING  - Therapy Goals: Discussed the aim to achieve optimal control without causing hypoglycemia, Targeting an A1C of <7%.  - CGM: Freestyle Mingo 2 was downloaded, personally interpreted, and reviewed with the patient during this visit. See noted below.  - Diabetic Supplies and Medications: Reviewed to ensure continued refills and compliance.  - Preventive Care: Advised the patient to schedule periodic eye exams and maintain regular foot care monitoring.  - Annual Monitoring: Reviewed the importance of yearly lipid profile (with statin use) and urine protein/creatinine tests.  - patient is having issue with affording diabetes medication:  Januvia is expensive    Plan  - Changes:  Advised patient to decrease Basaglar to 8 units QHS, monitor for symptoms of hypoglycemia can stopped insulin altogether  - Still with postprandial hyperglycemia with meal:  Patient concern for nephropathy: Start Jardiance 10 mg daily  - Continue max dose:  Januvia 100 mg daily and Metformin 1000 mg daily  - Dietary Modifications: Encouraged portion size control and reduction of carbohydrate intake to better manage diabetes.  - Continue CGM use given regular insulin injections daily:  Switch to Freestyle Mingo 3 plus next time with TMS  - Hypoglycemia Management: Discussed symptoms and treatment of hypoglycemia. The patient is informed; provided a monitoring/treatment handout.  - I offer glucagon injectables, he declined  - Clear written instructions provided on AVS. Follow-up scheduled within the next 3 months with lab work prior.   - Appointment date and time were provided to the patient today.

## 2024-11-14 NOTE — PROGRESS NOTES
Subjective:      Patient ID: Meño Garcia is a 84 y.o. male presented to [facility] today.  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 Viramontes at [facility]  Last A1c done 10/10/2024 is 7.3%  Per patient he is transferring care  Patient having issue paying for his medication: Januvia: Every expensive   Reports Basaglar insulin 16-20 units nightly.  With hypoglycemia on freestyle Mingo 2.    No symptoms of hypoglycemia overnight.  Had hypoglycemia 3:00 a.m. this morning.  Frequent hypoglycemia overnight around 2-5:00 a.m.: Glucose 53, 42, 62, 46.  This a.m.: 58, 56, at 5:00 a.m.        Diabetes mellitus Type 1/2  - Known diabetic complications: nephropathy  - Diagnosed w/ DM:  Many years  - Family history of diabetes: Yes  - Hx of pancreatitis/Diabetes Related Hospitalization:  None  - Previous see by Dr. Viramontes at [facility] 2024, transferring care.  Does not have PCP    Current reported meds:               Januvia 100 mg daily   Metformin 1000 mfg daily   Basiglar 16-20 units QHS  Previous meds tried:   Trulicity  Home glucose checks:  Freestyle Mingo 2  >CONTINUOUS GLUCOSE MONITOR DOWNLOAD AND INTERPRETATION<:  Data was personally reviewed by myself  CGM type: Freestyle mingo 2 sensor  Number of Day CGM worn:  14d, percentage of time CGM is active: 54%  Average blood glucose: 124, Glucose variability: 32.4, Glucose management indicator (GMI):  6.3%  Time in Range:  0% very high, 9% High, 81% Target Range, 7% low, 3% very low>> reviewed and discussed, goal TIR discussed with patient    Patient with very good glucose control in the daytime, but needs to scan more regularly.  Only scanning twice a  "day leading to no glucose tracking at lunch/dinner.  Postprandial hyperglycemia on occasion  Frequent hypoglycemia overnight around 2-5:00 a.m.: Glucose 53, 42, 62, 46.  This a.m.: 58, 56, at 5:00 a.m. per patient asymptomatic  Data and Recommendation discussed with patient in clinic today  >>See CGM data scan into our system, media tab<<      Diet/Exercise:   - Eating 3  meals per day: In the meals, high carbohydrate  - Exercise:  Not very active  - Weight trend: stable  - Occupation:  Retired physician    Health maintenance/prevention:  - Hypoglycemia: Aware a treatment plan for hypoglycemia  - Statin: Taking  - ACE/ARB: Taking  - Urine microalbumin yearly, done  - Eye exam current (within one year): yes,  DR: unknown  - Check feet regularly denies cuts or ulcers on feet    Diabetes lab work  No results found for: "HGBA1C", "VIRQSZY2A"  No results found for: "CPEPTIDE", "GLUTAMICACID", "ISLETCELLANT"   No results found for: "FRUCTOSAMINE", "GLYCOMARKTM"  Lab Results   Component Value Date    UTPCR 0.16 08/07/2024     Diabetes Management Status: Reviewed this office visit  Screening or Prevention Patient's value Goal Complete/Controlled?   Lipid profile Most Recent Lipid Panel Health Maintenance Topic Completion: Not Found Annually No   Dilated retinal exam Most Recent Eye Exam Date: Not Found Annually Yes   Foot exam   Most Recent Foot Exam Date: Not Found Annually Yes       The patient's medications, allergies, past medical, surgical, social and family histories were reviewed and updated as appropriate.  Review of Systems: pertinent positives as per the above HPI, and otherwise negative.    Objective:   /78   Pulse 75   Wt 65.5 kg (144 lb 6.4 oz)   BMI 21.32 kg/m²   Body mass index is 21.32 kg/m².  Vital signs reviewed    Physical Exam  Vitals and nursing note reviewed.   Constitutional:       Appearance: Normal appearance. He is well-developed. He is not ill-appearing.   Neck:      Thyroid: No " "thyromegaly.   Pulmonary:      Effort: Pulmonary effort is normal. No respiratory distress.   Musculoskeletal:         General: Normal range of motion.      Cervical back: Normal range of motion.   Neurological:      General: No focal deficit present.      Mental Status: He is alert. Mental status is at baseline.   Psychiatric:         Mood and Affect: Mood normal.         Behavior: Behavior normal.         Labs reviewed:  See results in subjective  No results found for: "HGBA1C"  No results found for: "CHOL", "HDL", "LDLCALC", "TRIG", "CHOLHDL"  Lab Results   Component Value Date     (L) 08/07/2024    K 4.4 08/07/2024     08/07/2024    CO2 23 08/07/2024     (H) 08/07/2024    BUN 15 08/07/2024    CREATININE 1.2 08/07/2024    CALCIUM 9.0 08/07/2024    PHOS 3.7 08/07/2024    ALBUMIN 3.6 08/07/2024    ANIONGAP 7 (L) 08/07/2024    EGFRNORACEVR 59.6 (A) 08/07/2024    PTH 51.8 08/07/2024     Most recent lab work done 10/10/2024: A1c 7.3%, BUN/creatinine:  19/1.09   Cholesterol panel: Total cholesterol 130, HDL 54, triglyceride 57,   Testosterone level:  460    Assessment     1. Type 2 diabetes mellitus with hyperglycemia, with long-term current use of insulin  empagliflozin (JARDIANCE) 10 mg tablet    SITagliptin phosphate (JANUVIA) 100 MG Tab    metFORMIN (GLUCOPHAGE) 1000 MG tablet    BASAGLAR KWIKPEN U-100 INSULIN 100 unit/mL (3 mL) InPn pen    pen needle, diabetic (BD ULTRA-FINE GEMA PEN NEEDLE) 32 gauge x 5/32" Ndle    FREESTYLE ELVER 3 PLUS SENSOR Magy    Basic Metabolic Panel    Hemoglobin A1C    Microalbumin/Creatinine Ratio, Urine      2. Diabetic nephropathy associated with type 2 diabetes mellitus  Ambulatory referral/consult to Endocrinology      3. Type 2 diabetes mellitus with hyperglycemia, without long-term current use of insulin        4. Hypoglycemia associated with type 2 diabetes mellitus  FREESTYLE ELVER 3 PLUS SENSOR Magy      5. Primary hypertension           Plan     Hypoglycemia " associated with type 2 diabetes mellitus  - Diabetes is AT GOAL, as indicated by the most recent A1C reviewed on 11/14/2024.  COMPLICATED BY FREQUENT OVERNIGHT HYPOGLYCEMIA, VERY CONCERNING  - Therapy Goals: Discussed the aim to achieve optimal control without causing hypoglycemia, Targeting an A1C of <7%.  - CGM: Freestyle Mingo 2 was downloaded, personally interpreted, and reviewed with the patient during this visit. See noted below.  - Diabetic Supplies and Medications: Reviewed to ensure continued refills and compliance.  - Preventive Care: Advised the patient to schedule periodic eye exams and maintain regular foot care monitoring.  - Annual Monitoring: Reviewed the importance of yearly lipid profile (with statin use) and urine protein/creatinine tests.  - patient is having issue with affording diabetes medication:  Januvia is expensive    Plan  - Changes:  Advised patient to decrease Basaglar to 8 units QHS, monitor for symptoms of hypoglycemia can stopped insulin altogether  - Still with postprandial hyperglycemia with meal:  Patient concern for nephropathy: Start Jardiance 10 mg daily  - Continue max dose:  Januvia 100 mg daily and Metformin 1000 mg daily  - Dietary Modifications: Encouraged portion size control and reduction of carbohydrate intake to better manage diabetes.  - Continue CGM use given regular insulin injections daily:  Switch to Freestyle Mingo 3 plus next time with TMS  - Hypoglycemia Management: Discussed symptoms and treatment of hypoglycemia. The patient is informed; provided a monitoring/treatment handout.  - I offer glucagon injectables, he declined  - Clear written instructions provided on AVS. Follow-up scheduled within the next 3 months with lab work prior.   - Appointment date and time were provided to the patient today.     Type 2 diabetes mellitus with hyperglycemia, with long-term current use of insulin  - see plan above    Primary hypertension  - monitor blood  pressure    Diabetic nephropathy associated with type 2 diabetes mellitus  - sees Nephrology   - attempt to get SGLT2       Advised patient to follow up with PCP for routine health maintenance care.   RTC in *3 months      Stan Wise M.D.  Endocrinology  [facility]  11/14/2024      Disclaimer: This note has been generated in part with the use of voice-recognition software. There may be typographical errors that have been missed during proof-reading.  -------------------------------------------------------------------------------------------------  Indications for CGMs:    Patent with diagnosed: Diabetes Mellitus that required frequent glucose monitoring (4 + times a day and 4x/day testing), frequent adjustments to insulin regiment based on BG or CGM results. Patent requires a therapeutic CGM and is willing to use therapeutic CGM/SMBG for Necessary frequent adjustments in insulin therapy, patient demonstrated an understanding of the technology (can use and recognize alerts and alarms). I, the physician, have assessed adherence to CGM regimen and to the plan, patient will have close follow up in clinic as indicated, every 3 months to 6 months.     Patient experiences (including but not limited to):  [x]   Discrepancies between records and A1C, at risk severe hypoglycemia episode and hospitalization  [x]   Recurrent, unexplained, severe hypoglycemic episodes  [x]   Postprandial hyperglycemia  [x]   Unexplained blood glucose excursions  [x]   Impaired awareness of hypoglycemia    []   Patient uses insulin pump for diabetes management: will need frequent adjustments to insulin regiment based on BG: including adjustment to  insulin pump setting, sliding scale, correction factor, additional bolusing/correction    I believe that the patient will greatly benefit from wearing CGM because this will allow for better glucose control, help to avoid high/lows and prevent hospitalization.  This also  is safer for patient in using CGM during the COVID public health emergency.

## 2024-11-16 ENCOUNTER — HOSPITAL ENCOUNTER (OUTPATIENT)
Dept: RADIOLOGY | Facility: HOSPITAL | Age: 84
Discharge: HOME | End: 2024-11-16
Attending: INTERNAL MEDICINE
Payer: MEDICARE

## 2024-11-16 DIAGNOSIS — N18.31 STAGE 3A CHRONIC KIDNEY DISEASE: ICD-10-CM

## 2024-11-16 DIAGNOSIS — D64.9 ANEMIA, UNSPECIFIED TYPE: ICD-10-CM

## 2024-11-16 DIAGNOSIS — E11.21 DIABETIC NEPHROPATHY ASSOCIATED WITH TYPE 2 DIABETES MELLITUS: ICD-10-CM

## 2024-11-16 DIAGNOSIS — E87.1 HYPONATREMIA: ICD-10-CM

## 2024-11-16 PROCEDURE — 76770 US EXAM ABDO BACK WALL COMP: CPT | Mod: 26,,, | Performed by: RADIOLOGY

## 2024-11-16 PROCEDURE — 76770 US EXAM ABDO BACK WALL COMP: CPT | Mod: TC

## 2024-12-04 ENCOUNTER — HOSPITAL ENCOUNTER (OUTPATIENT)
Dept: CARDIOLOGY | Facility: OTHER | Age: 84
Discharge: HOME | End: 2024-12-04
Attending: INTERNAL MEDICINE
Payer: MEDICARE

## 2024-12-04 ENCOUNTER — HOSPITAL ENCOUNTER (OUTPATIENT)
Dept: PULMONOLOGY | Facility: OTHER | Age: 84
Discharge: HOME | End: 2024-12-04
Attending: INTERNAL MEDICINE
Payer: MEDICARE

## 2024-12-04 VITALS — RESPIRATION RATE: 18 BRPM | HEART RATE: 60 BPM | OXYGEN SATURATION: 99 %

## 2024-12-04 VITALS
DIASTOLIC BLOOD PRESSURE: 78 MMHG | HEIGHT: 69 IN | BODY MASS INDEX: 21.33 KG/M2 | WEIGHT: 144 LBS | SYSTOLIC BLOOD PRESSURE: 116 MMHG

## 2024-12-04 DIAGNOSIS — R06.00 DYSPNEA, UNSPECIFIED TYPE: ICD-10-CM

## 2024-12-04 DIAGNOSIS — Q21.12 PFO (PATENT FORAMEN OVALE): ICD-10-CM

## 2024-12-04 LAB
AORTIC ROOT ANNULUS: 2.78 CM
ASCENDING AORTA: 3.3 CM
AV INDEX (PROSTH): 0.51
AV MEAN GRADIENT: 3.7 MMHG
AV PEAK GRADIENT: 6.8 MMHG
AV REGURGITATION PRESSURE HALF TIME: 499.12 MS
AV VALVE AREA BY VELOCITY RATIO: 1.4 CM²
AV VALVE AREA: 1.6 CM²
AV VELOCITY RATIO: 0.46
BSA FOR ECHO PROCEDURE: 1.78 M2
CV ECHO LV RWT: 0.37 CM
DLCO ADJ PRE: 13.62 ML/(MIN*MMHG)
DLCO SINGLE BREATH LLN: 16.57
DLCO SINGLE BREATH PRE REF: 58 %
DLCO SINGLE BREATH REF: 23.5
DLCOC SBVA LLN: 2.25
DLCOC SBVA PRE REF: 102.7 %
DLCOC SBVA REF: 3.4
DLCOC SINGLE BREATH LLN: 16.57
DLCOC SINGLE BREATH PRE REF: 58 %
DLCOC SINGLE BREATH REF: 23.5
DLCOCSBVAULN: 4.56
DLCOCSINGLEBREATHULN: 30.42
DLCOCSINGLEBREATHZSCORE: -2.35
DLCOSINGLEBREATHULN: 30.42
DLCOSINGLEBREATHZSCORE: -2.35
DLCOVA LLN: 2.25
DLCOVA PRE REF: 102.7 %
DLCOVA PRE: 3.5 ML/(MIN*MMHG*L)
DLCOVA REF: 3.4
DLCOVAULN: 4.56
DLVAADJ PRE: 3.5 ML/(MIN*MMHG*L)
DOP CALC AO PEAK VEL: 1.3 M/S
DOP CALC AO VTI: 34.4 CM
DOP CALC LVOT AREA: 3.1 CM2
DOP CALC LVOT DIAMETER: 2 CM
DOP CALC LVOT PEAK VEL: 0.6 M/S
DOP CALC LVOT STROKE VOLUME: 54.6 CM3
DOP CALCLVOT PEAK VEL VTI: 17.4 CM
E WAVE DECELERATION TIME: 311.64 MSEC
E/A RATIO: 0.75
E/E' RATIO: 9.25 M/S
ECHO LV POSTERIOR WALL: 0.9 CM (ref 0.6–1.1)
ERVN2 LLN: -16449.15
ERVN2 PRE REF: 44.6 %
ERVN2 PRE: 0.38 L
ERVN2 REF: 0.85
ERVN2ULN: NORMAL
FEF 25 75 LLN: 0.77
FEF 25 75 PRE REF: 77.3 %
FEF 25 75 REF: 2.48
FET100 CHG: 7.1 %
FEV05 LLN: 1.17
FEV05 REF: 2.3
FEV1 CHG: 0.1 %
FEV1 FVC LLN: 63
FEV1 FVC PRE REF: 106.2 %
FEV1 FVC REF: 76
FEV1 LLN: 1.62
FEV1 PRE REF: 71.7 %
FEV1 REF: 2.42
FEV1 VOL CHG: 0
FEV1FVCZSCORE: 0.6
FEV1ZSCORE: -1.41
FRACTIONAL SHORTENING: 28.6 % (ref 28–44)
FRCN2 LLN: 2.77
FRCN2 PRE REF: 196.2 %
FRCN2 REF: 3.76
FRCN2ULN: 4.75
FVC CHG: 6.9 %
FVC LLN: 2.25
FVC PRE REF: 66.9 %
FVC REF: 3.2
FVC VOL CHG: 0.15
FVCZSCORE: -1.83
ICN2REF: 2.82
INTERVENTRICULAR SEPTUM: 0.8 CM (ref 0.6–1.1)
IVC DIAMETER: 1.77 CM
IVC PRE: 2.1 L
IVC SINGLE BREATH LLN: 2.25
IVC SINGLE BREATH PRE REF: 65.6 %
IVC SINGLE BREATH REF: 3.2
IVCSINGLEBREATHULN: 4.17
LA MAJOR: 6.53 CM
LA WIDTH: 3.2 CM
LAAS-AP2: 19 CM2
LAAS-AP4: 22 CM2
LEFT ATRIUM AREA SYSTOLIC (APICAL 2 CHAMBER): 19.13 CM2
LEFT ATRIUM AREA SYSTOLIC (APICAL 4 CHAMBER): 22.07 CM2
LEFT ATRIUM SIZE: 3.26 CM
LEFT ATRIUM VOLUME INDEX MOD: 31 ML/M2
LEFT ATRIUM VOLUME MOD: 55.71 ML
LEFT INTERNAL DIMENSION IN SYSTOLE: 3.5 CM (ref 2.1–4)
LEFT VENTRICLE DIASTOLIC VOLUME INDEX: 61.58 ML/M2
LEFT VENTRICLE DIASTOLIC VOLUME: 110.85 ML
LEFT VENTRICLE END SYSTOLIC VOLUME APICAL 2 CHAMBER: 49.37 ML
LEFT VENTRICLE END SYSTOLIC VOLUME APICAL 4 CHAMBER: 62.51 ML
LEFT VENTRICLE MASS INDEX: 78.8 G/M2
LEFT VENTRICLE SYSTOLIC VOLUME INDEX: 27.9 ML/M2
LEFT VENTRICLE SYSTOLIC VOLUME: 50.15 ML
LEFT VENTRICULAR INTERNAL DIMENSION IN DIASTOLE: 4.9 CM (ref 3.5–6)
LEFT VENTRICULAR MASS: 141.9 G
LLN IC N2: -9999997.18
LV LATERAL E/E' RATIO: 9.25 M/S
LV SEPTAL E/E' RATIO: 9.25 M/S
LVED V (TEICH): 110.85 ML
LVES V (TEICH): 50.15 ML
LVOT MG: 0.93 MMHG
LVOT MV: 0.46 CM/S
MV PEAK A VEL: 0.99 M/S
MV PEAK E VEL: 0.74 M/S
MV STENOSIS PRESSURE HALF TIME: 90.37 MS
MV VALVE AREA P 1/2 METHOD: 2.43 CM2
PEF LLN: 5.29
PEF PRE REF: 71.5 %
PEF REF: 7.28
PHYSICIAN COMMENT: NORMAL
PISA AR MAX VEL: 4.39 M/S
PISA TR MAX VEL: 2.27 M/S
POST FEF 25 75: 1.44 L/S
POST FET 100: 7.97 SEC
POST FEV1 FVC: 75.92 %
POST FEV1: 1.74 L
POST FEV5: 1.42 L
POST FVC: 2.29 L
POST PEF: 4.87 L/S
PRE DLCO: 13.62 ML/(MIN*MMHG)
PRE FEF 25 75: 1.92 L/S
PRE FET 100: 7.44 SEC
PRE FEV05 REF: 63.1 %
PRE FEV1 FVC: 81.12 %
PRE FEV1: 1.74 L
PRE FEV5: 1.45 L
PRE FRC N2: 7.38 L
PRE FVC: 2.14 L
PRE IC N2: 1.55 L
PRE PEF: 5.21 L/S
PRE REF IC N2: 54.9 %
PV MV: 0.48 M/S
PV PEAK GRADIENT: 3 MMHG
PV PEAK VELOCITY: 0.84 M/S
RA MAJOR: 4.1 CM
RA PRESSURE ESTIMATED: 3 MMHG
RA WIDTH: 3.1 CM
RIGHT VENTRICULAR END-DIASTOLIC DIMENSION: 2.05 CM
RV TB RVSP: 5 MMHG
RVN2 LLN: 2.24
RVN2 PRE REF: 232.4 %
RVN2 PRE: 6.76 L
RVN2 REF: 2.91
RVN2TLCN2 LLN: 37.74
RVN2TLCN2 PRE REF: 162.1 %
RVN2TLCN2 PRE: 75.74 %
RVN2TLCN2 REF: 46.72
RVN2TLCN2ULN: 55.7
RVN2ULN: 3.58
SINUS: 3.2 CM
STJ: 3.09 CM
TDI LATERAL: 0.08 M/S
TDI SEPTAL: 0.08 M/S
TDI: 0.08 M/S
TLCN2 LLN: 5.75
TLCN2 PRE REF: 129.4 %
TLCN2 PRE: 8.93 L
TLCN2 REF: 6.9
TLCN2ULN: 8.05
TLCN2ZSCORE: 2.9
TR MAX PG: 21 MMHG
TRICUSPID ANNULAR PLANE SYSTOLIC EXCURSION: 1.6 CM
TV REST PULMONARY ARTERY PRESSURE: 24 MMHG
ULN IC N2: NORMAL
VA PRE: 3.9 L
VA SINGLE BREATH LLN: 6.75
VA SINGLE BREATH PRE REF: 57.7 %
VA SINGLE BREATH REF: 6.75
VASINGLEBREATHULN: 6.75
VCMAXN2 LLN: 2.25
VCMAXN2 PRE REF: 67.7 %
VCMAXN2 PRE: 2.17 L
VCMAXN2 REF: 3.2
VCMAXN2ULN: 4.17
Z-SCORE OF LEFT VENTRICULAR DIMENSION IN END DIASTOLE: -0.16
Z-SCORE OF LEFT VENTRICULAR DIMENSION IN END SYSTOLE: 1.02

## 2024-12-04 PROCEDURE — 94727 GAS DIL/WSHOT DETER LNG VOL: CPT

## 2024-12-04 PROCEDURE — 94060 EVALUATION OF WHEEZING: CPT | Mod: 26,,, | Performed by: INTERNAL MEDICINE

## 2024-12-04 PROCEDURE — 94729 DIFFUSING CAPACITY: CPT | Mod: 26,,, | Performed by: INTERNAL MEDICINE

## 2024-12-04 PROCEDURE — 94727 GAS DIL/WSHOT DETER LNG VOL: CPT | Mod: 26,,, | Performed by: INTERNAL MEDICINE

## 2024-12-04 PROCEDURE — 25000242 PHARM REV CODE 250 ALT 637 W/ HCPCS: Performed by: INTERNAL MEDICINE

## 2024-12-04 PROCEDURE — 93306 TTE W/DOPPLER COMPLETE: CPT | Mod: 26,,, | Performed by: INTERNAL MEDICINE

## 2024-12-04 PROCEDURE — 93306 TTE W/DOPPLER COMPLETE: CPT

## 2024-12-04 PROCEDURE — 94060 EVALUATION OF WHEEZING: CPT

## 2024-12-04 PROCEDURE — 94729 DIFFUSING CAPACITY: CPT

## 2024-12-04 RX ORDER — ALBUTEROL SULFATE 2.5 MG/.5ML
2.5 SOLUTION RESPIRATORY (INHALATION) ONCE
Status: COMPLETED | OUTPATIENT
Start: 2024-12-04 | End: 2024-12-04

## 2024-12-04 RX ADMIN — ALBUTEROL SULFATE 2.5 MG: 2.5 SOLUTION RESPIRATORY (INHALATION) at 12:12

## 2025-01-05 ENCOUNTER — PATIENT MESSAGE (OUTPATIENT)
Dept: ENDOCRINOLOGY | Facility: CLINIC | Age: 85
End: 2025-01-05
Payer: MEDICARE

## 2025-01-05 DIAGNOSIS — Z79.4 TYPE 2 DIABETES MELLITUS WITH HYPERGLYCEMIA, WITH LONG-TERM CURRENT USE OF INSULIN: ICD-10-CM

## 2025-01-05 DIAGNOSIS — E11.65 TYPE 2 DIABETES MELLITUS WITH HYPERGLYCEMIA, WITH LONG-TERM CURRENT USE OF INSULIN: ICD-10-CM

## 2025-01-27 ENCOUNTER — LAB VISIT (OUTPATIENT)
Dept: LAB | Facility: HOSPITAL | Age: 85
End: 2025-01-27
Attending: INTERNAL MEDICINE
Payer: MEDICARE

## 2025-01-27 DIAGNOSIS — E87.1 HYPONATREMIA: ICD-10-CM

## 2025-01-27 DIAGNOSIS — N18.31 STAGE 3A CHRONIC KIDNEY DISEASE: ICD-10-CM

## 2025-01-27 DIAGNOSIS — E11.21 DIABETIC NEPHROPATHY ASSOCIATED WITH TYPE 2 DIABETES MELLITUS: ICD-10-CM

## 2025-01-27 DIAGNOSIS — D64.9 ANEMIA, UNSPECIFIED TYPE: ICD-10-CM

## 2025-01-27 LAB
ALBUMIN SERPL BCP-MCNC: 3.6 G/DL (ref 3.5–5.2)
ANION GAP SERPL CALC-SCNC: 8 MMOL/L (ref 8–16)
BASOPHILS # BLD AUTO: 0.1 K/UL (ref 0–0.2)
BASOPHILS NFR BLD: 1.4 % (ref 0–1.9)
BUN SERPL-MCNC: 22 MG/DL (ref 8–23)
CALCIUM SERPL-MCNC: 8.9 MG/DL (ref 8.7–10.5)
CHLORIDE SERPL-SCNC: 108 MMOL/L (ref 95–110)
CO2 SERPL-SCNC: 20 MMOL/L (ref 23–29)
CREAT SERPL-MCNC: 1.3 MG/DL (ref 0.5–1.4)
DIFFERENTIAL METHOD BLD: ABNORMAL
EOSINOPHIL # BLD AUTO: 0.3 K/UL (ref 0–0.5)
EOSINOPHIL NFR BLD: 3.6 % (ref 0–8)
ERYTHROCYTE [DISTWIDTH] IN BLOOD BY AUTOMATED COUNT: 14.1 % (ref 11.5–14.5)
EST. GFR  (NO RACE VARIABLE): 54.2 ML/MIN/1.73 M^2
GLUCOSE SERPL-MCNC: 109 MG/DL (ref 70–110)
HCT VFR BLD AUTO: 40.2 % (ref 40–54)
HGB BLD-MCNC: 12.9 G/DL (ref 14–18)
IMM GRANULOCYTES # BLD AUTO: 0.02 K/UL (ref 0–0.04)
IMM GRANULOCYTES NFR BLD AUTO: 0.3 % (ref 0–0.5)
LYMPHOCYTES # BLD AUTO: 1.5 K/UL (ref 1–4.8)
LYMPHOCYTES NFR BLD: 21.3 % (ref 18–48)
MCH RBC QN AUTO: 28.2 PG (ref 27–31)
MCHC RBC AUTO-ENTMCNC: 32.1 G/DL (ref 32–36)
MCV RBC AUTO: 88 FL (ref 82–98)
MONOCYTES # BLD AUTO: 0.5 K/UL (ref 0.3–1)
MONOCYTES NFR BLD: 7.8 % (ref 4–15)
NEUTROPHILS # BLD AUTO: 4.6 K/UL (ref 1.8–7.7)
NEUTROPHILS NFR BLD: 65.6 % (ref 38–73)
NRBC BLD-RTO: 0 /100 WBC
OSMOLALITY SERPL: 297 MOSM/KG (ref 280–300)
PHOSPHATE SERPL-MCNC: 3.7 MG/DL (ref 2.7–4.5)
PLATELET # BLD AUTO: 244 K/UL (ref 150–450)
PMV BLD AUTO: 9.9 FL (ref 9.2–12.9)
POTASSIUM SERPL-SCNC: 4.3 MMOL/L (ref 3.5–5.1)
PTH-INTACT SERPL-MCNC: 74 PG/ML (ref 9–77)
RBC # BLD AUTO: 4.58 M/UL (ref 4.6–6.2)
SODIUM SERPL-SCNC: 136 MMOL/L (ref 136–145)
URATE SERPL-MCNC: 3.8 MG/DL (ref 3.4–7)
WBC # BLD AUTO: 6.96 K/UL (ref 3.9–12.7)

## 2025-01-27 PROCEDURE — 80069 RENAL FUNCTION PANEL: CPT | Performed by: INTERNAL MEDICINE

## 2025-01-27 PROCEDURE — 83970 ASSAY OF PARATHORMONE: CPT | Performed by: INTERNAL MEDICINE

## 2025-01-27 PROCEDURE — 85025 COMPLETE CBC W/AUTO DIFF WBC: CPT | Performed by: INTERNAL MEDICINE

## 2025-01-27 PROCEDURE — 83930 ASSAY OF BLOOD OSMOLALITY: CPT | Performed by: INTERNAL MEDICINE

## 2025-01-27 PROCEDURE — 36415 COLL VENOUS BLD VENIPUNCTURE: CPT | Mod: PO | Performed by: INTERNAL MEDICINE

## 2025-01-27 PROCEDURE — 84550 ASSAY OF BLOOD/URIC ACID: CPT | Performed by: INTERNAL MEDICINE

## 2025-02-05 ENCOUNTER — OFFICE VISIT (OUTPATIENT)
Dept: NEPHROLOGY | Facility: CLINIC | Age: 85
End: 2025-02-05
Payer: MEDICARE

## 2025-02-05 VITALS
OXYGEN SATURATION: 97 % | HEIGHT: 70 IN | DIASTOLIC BLOOD PRESSURE: 74 MMHG | RESPIRATION RATE: 18 BRPM | HEART RATE: 67 BPM | SYSTOLIC BLOOD PRESSURE: 110 MMHG | BODY MASS INDEX: 20.72 KG/M2 | WEIGHT: 144.75 LBS

## 2025-02-05 DIAGNOSIS — D64.9 ANEMIA, UNSPECIFIED TYPE: ICD-10-CM

## 2025-02-05 DIAGNOSIS — N18.31 STAGE 3A CHRONIC KIDNEY DISEASE: ICD-10-CM

## 2025-02-05 DIAGNOSIS — E11.21 DIABETIC NEPHROPATHY ASSOCIATED WITH TYPE 2 DIABETES MELLITUS: Primary | ICD-10-CM

## 2025-02-05 DIAGNOSIS — E87.1 HYPONATREMIA: ICD-10-CM

## 2025-02-05 DIAGNOSIS — B37.41 YEAST CYSTITIS: ICD-10-CM

## 2025-02-05 DIAGNOSIS — N28.1 RENAL CYST: ICD-10-CM

## 2025-02-05 PROCEDURE — G2211 COMPLEX E/M VISIT ADD ON: HCPCS | Mod: S$PBB,,, | Performed by: INTERNAL MEDICINE

## 2025-02-05 PROCEDURE — 99214 OFFICE O/P EST MOD 30 MIN: CPT | Mod: PBBFAC | Performed by: INTERNAL MEDICINE

## 2025-02-05 PROCEDURE — 99214 OFFICE O/P EST MOD 30 MIN: CPT | Mod: S$PBB,,, | Performed by: INTERNAL MEDICINE

## 2025-02-05 PROCEDURE — 99999 PR PBB SHADOW E&M-EST. PATIENT-LVL IV: CPT | Mod: PBBFAC,,, | Performed by: INTERNAL MEDICINE

## 2025-02-05 RX ORDER — CLOBETASOL PROPIONATE 0.5 MG/G
1 OINTMENT TOPICAL 2 TIMES DAILY
COMMUNITY
Start: 2024-12-04

## 2025-02-05 RX ORDER — TACROLIMUS 1 MG/G
OINTMENT TOPICAL
COMMUNITY
Start: 2024-12-07

## 2025-02-05 RX ORDER — FLUCONAZOLE 150 MG/1
150 TABLET ORAL ONCE
Qty: 1 TABLET | Refills: 0 | Status: SHIPPED | OUTPATIENT
Start: 2025-02-05 | End: 2025-02-05

## 2025-02-05 RX ORDER — CARVEDILOL 6.25 MG/1
6.25 TABLET ORAL 2 TIMES DAILY
COMMUNITY

## 2025-02-05 NOTE — PROGRESS NOTES
[facility]  [address] [facility], [address]  [place]  [phone]    PCP: Rizwana, Primary Doctor  Cardiologist: Ne Brown: Billie    HPI:  Meño Garcia is a 85 y.o. male with HTN, T2DM, and PFO who is here for established patient evaluation of Chronic Kidney Disease  Initial visit was 8/15/24. His baseline Cr appears to be 0.9-1.0 with GFR > 60%; last at baseline 3/2024. He first noted microalbuminuria in 1/2024. ACR 39 on 1/12/24 and ACR 43 in 4/11/24.   Labs also with intermittent hyponatremia since at least 2016; Na 134 today.      He was diagnosed with T2DM in the 1990s. Last A1c was 7.7. Tracks his glucose levels which range from 50-upper 200s. He notes nocturnal hypoglycemia. He has never been on a SGLT2i.   He reports good water intake; 2-3 bottles per day. He is prescribed HCTZ PRN for edema, but hasn't taken in several months. No edema on exam.       Interval Hx:   LV 8/15/24: UPCR negative, Na 132. Recommended tracking fluid intake.   He was started on Jardiance in 11/2024.  Today he reports to be doing well. He denies hypotension. -140s qAM prior to taking medications. He doesn't check his BP after taking BP medications. No edema. No issues with Jardiance. Drinks 7-8 cups of water per day.  UA with yeast; denies dysuria or pruritus.       ROS:  Complete ROS otherwise negative except as indicated above.         Current Outpatient Medications:     amLODIPine (NORVASC) 10 MG tablet, Take 10 mg by mouth once daily., Disp: , Rfl:     aspirin (ECOTRIN) 81 MG EC tablet, 81 mg once daily., Disp: , Rfl:     atorvastatin (LIPITOR) 10 MG tablet, 10 mg. Mon and Fri, Disp: , Rfl:     BASAGLAR KWIKPEN U-100 INSULIN 100 unit/mL (3 mL) InPn pen, Inject 8 Units into the skin every evening., Disp: 15 mL, Rfl: 3    bisacodyL (DULCOLAX, BISACODYL,) 5 mg EC tablet, 5 mg once daily., Disp: , Rfl:     carvediloL (COREG) 6.25 MG tablet, Take 6.25 mg by mouth 2 (two) times daily., Disp: , Rfl:     clobetasol 0.05%  "(TEMOVATE) 0.05 % Oint, Apply 1 g topically 2 (two) times daily., Disp: , Rfl:     docusate sodium (COLACE) 100 MG capsule, 100 mg 2 (two) times a day., Disp: , Rfl:     empagliflozin (JARDIANCE) 10 mg tablet, Take 1 tablet (10 mg total) by mouth once daily., Disp: 90 tablet, Rfl: 3    ergocalciferol (ERGOCALCIFEROL) 50,000 unit Cap, Take 50,000 Units by mouth every 7 days., Disp: , Rfl:     famotidine (PEPCID) 40 MG tablet, Take 40 mg by mouth once daily., Disp: , Rfl:     FREESTYLE ELVER 3 PLUS SENSOR Magy, Use 1 sensor for every 15 days, (2 sensors a month),  ICD10: E11.65, Disp: 2 each, Rfl: 11    losartan (COZAAR) 100 MG tablet, Take 1 tablet (100 mg total) by mouth once daily., Disp: 90 tablet, Rfl: 3    metFORMIN (GLUCOPHAGE) 1000 MG tablet, Take 1 tablet (1,000 mg total) by mouth 2 (two) times daily with meals., Disp: 90 tablet, Rfl: 3    pen needle, diabetic (BD ULTRA-FINE GEMA PEN NEEDLE) 32 gauge x 5/32" Ndle, Use needles to inject insulin daily times a day. ICD-10: E11.65, Disp: 100 each, Rfl: 5    SITagliptin phosphate (JANUVIA) 100 MG Tab, Take 1 tablet (100 mg total) by mouth once daily., Disp: 30 tablet, Rfl: 6    tacrolimus (PROTOPIC) 0.1 % ointment, Apply topically., Disp: , Rfl:     carvediloL (COREG) 12.5 MG tablet, Take 1 tablet (12.5 mg total) by mouth 2 (two) times daily with meals., Disp: 180 tablet, Rfl: 3      Vitals: Blood pressure 110/74, pulse 67, resp. rate 18, height 5' 9.5" (1.765 m), weight 65.6 kg (144 lb 11.7 oz), SpO2 97%. Body mass index is 21.07 kg/m².    Physical Exam  Vitals reviewed.   Constitutional:       General: He is awake. He is not in acute distress.     Appearance: Normal appearance. He is well-developed.   HENT:      Head: Normocephalic and atraumatic.      Nose: Nose normal.      Mouth/Throat:      Mouth: Mucous membranes are moist.   Eyes:      Extraocular Movements: Extraocular movements intact.      Conjunctiva/sclera: Conjunctivae normal.   Pulmonary:      " Effort: Pulmonary effort is normal.   Musculoskeletal:         General: No tenderness or signs of injury.      Right lower leg: No edema.      Left lower leg: No edema.   Skin:     General: Skin is warm and dry.      Findings: No erythema or rash.   Neurological:      General: No focal deficit present.      Mental Status: He is alert. Mental status is at baseline.   Psychiatric:         Mood and Affect: Mood normal.         Behavior: Behavior normal.           Labs/Imaging:  Sodium   Date Value Ref Range Status   01/27/2025 136 136 - 145 mmol/L Final   08/07/2024 134 (L) 136 - 145 mmol/L Final     Potassium   Date Value Ref Range Status   01/27/2025 4.3 3.5 - 5.1 mmol/L Final   08/07/2024 4.4 3.5 - 5.1 mmol/L Final     Chloride   Date Value Ref Range Status   01/27/2025 108 95 - 110 mmol/L Final   08/07/2024 104 95 - 110 mmol/L Final     CO2   Date Value Ref Range Status   01/27/2025 20 (L) 23 - 29 mmol/L Final   08/07/2024 23 23 - 29 mmol/L Final     BUN   Date Value Ref Range Status   01/27/2025 22 8 - 23 mg/dL Final   08/07/2024 15 8 - 23 mg/dL Final     Creatinine   Date Value Ref Range Status   01/27/2025 1.3 0.5 - 1.4 mg/dL Final   08/07/2024 1.2 0.5 - 1.4 mg/dL Final     eGFR   Date Value Ref Range Status   01/27/2025 54.2 (A) >60 mL/min/1.73 m^2 Final   08/07/2024 59.6 (A) >60 mL/min/1.73 m^2 Final     Calcium   Date Value Ref Range Status   01/27/2025 8.9 8.7 - 10.5 mg/dL Final   08/07/2024 9.0 8.7 - 10.5 mg/dL Final     Phosphorus   Date Value Ref Range Status   01/27/2025 3.7 2.7 - 4.5 mg/dL Final   08/07/2024 3.7 2.7 - 4.5 mg/dL Final     Albumin   Date Value Ref Range Status   01/27/2025 3.6 3.5 - 5.2 g/dL Final   08/07/2024 3.6 3.5 - 5.2 g/dL Final       PTH, Intact   Date Value Ref Range Status   01/27/2025 74.0 9.0 - 77.0 pg/mL Final   08/07/2024 51.8 9.0 - 77.0 pg/mL Final     Uric Acid   Date Value Ref Range Status   01/27/2025 3.8 3.4 - 7.0 mg/dL Final   08/07/2024 4.1 3.4 - 7.0 mg/dL Final        Hemoglobin   Date Value Ref Range Status   01/27/2025 12.9 (L) 14.0 - 18.0 g/dL Final   08/07/2024 12.6 (L) 14.0 - 18.0 g/dL Final       Prot/Creat Ratio, Urine   Date Value Ref Range Status   01/27/2025 0.30 (H) 0.00 - 0.20 Final   08/07/2024 0.16 0.00 - 0.20 Final       1/27/25: SOsm 297, UOsm 253, Haile < 10    4/11/24: ACR 43  1/12/24: Cr 0.94, GFR 80, Na 139. A1c 7.7. ACR 39.   3/2/23: Cr 1.01, GFR 74%    Renal US: 11/16/24:   Right kidney: The right kidney measures 9.7 cm. Echogenic cortex.  Resistive index measures 0.78.  Upper pole simple cysts largest of which measures 4.3 cm.  No stones, mass, or hydronephrosis.     Left kidney: The left kidney measures 10.4 cm. No cortical thinning or loss of corticomedullary distinction.  Resistive index measures 0.65.  No stones, mass, or hydronephrosis.     Urinary bladder decompressed.      Impression/Plan:    Diabetic nephropathy associated with type 2 diabetes mellitus  - ACR 39 in 1/2024 --> 43 in 4/2024; due to diabetic nephropathy  - UPCR negative at last visit --> 0.30 today; mildly positive  - continue max dose losartan and Jardiance  - UA today with yeast; ordered diflucan. Will check urine cx at next visit  - will add Kerendia to regimen if UPCR ever > 0.5    Stage 3a chronic kidney disease  - baseline Cr 0.9-1.0 with GFR 70-80s; due to diabetic nephropathy  - Cr 1.2 --> 1.3 today; likely related to SGLT2i  - continue good water intake while monitoring Na levels  - avoid hypotension    Hyponatremia  - intermittently present since 2016  - Na 134 --> 136 today; improved. SOsm normal.   - continue good water intake. Will trend.     Anemia  - hgb 12.9; at goal for CKD. Will trend.     Renal cyst  - renal US with 4.3cm simple cyst  - will trend q 2 years or so given large size      Visit today included increased complexity associated with the care of the episodic problem hypoNa  addressed and managing the longitudinal care of the patient due to the serious  and/or complex managed problem(s) CKD.      Treatment options and plan were discussed with the patient and/or caregiver.   RTC 3 months.       Emili Torres MD  [facility]  [phone]

## 2025-02-09 NOTE — ASSESSMENT & PLAN NOTE
- intermittently present since 2016  - Na 134 --> 136 today; improved. SOsm normal.   - continue good water intake. Will trend.

## 2025-02-09 NOTE — ASSESSMENT & PLAN NOTE
- ACR 39 in 1/2024 --> 43 in 4/2024; due to diabetic nephropathy  - UPCR negative at last visit --> 0.30 today; mildly positive  - continue max dose losartan and Jardiance  - UA today with yeast; ordered diflucan. Will check urine cx at next visit  - will add Kerendia to regimen if UPCR ever > 0.5

## 2025-02-09 NOTE — ASSESSMENT & PLAN NOTE
- baseline Cr 0.9-1.0 with GFR 70-80s; due to diabetic nephropathy  - Cr 1.2 --> 1.3 today; likely related to SGLT2i  - continue good water intake while monitoring Na levels  - avoid hypotension

## 2025-04-01 ENCOUNTER — LAB VISIT (OUTPATIENT)
Dept: LAB | Facility: HOSPITAL | Age: 85
End: 2025-04-01
Payer: MEDICARE

## 2025-04-01 DIAGNOSIS — E11.65 TYPE 2 DIABETES MELLITUS WITH HYPERGLYCEMIA, WITH LONG-TERM CURRENT USE OF INSULIN: ICD-10-CM

## 2025-04-01 DIAGNOSIS — Z79.4 TYPE 2 DIABETES MELLITUS WITH HYPERGLYCEMIA, WITH LONG-TERM CURRENT USE OF INSULIN: ICD-10-CM

## 2025-04-01 LAB
ANION GAP (OHS): 7 MMOL/L (ref 8–16)
BUN SERPL-MCNC: 21 MG/DL (ref 8–23)
CALCIUM SERPL-MCNC: 9.1 MG/DL (ref 8.7–10.5)
CHLORIDE SERPL-SCNC: 102 MMOL/L (ref 95–110)
CO2 SERPL-SCNC: 27 MMOL/L (ref 23–29)
CREAT SERPL-MCNC: 1 MG/DL (ref 0.5–1.4)
EAG (OHS): 163 MG/DL (ref 68–131)
GFR SERPLBLD CREATININE-BSD FMLA CKD-EPI: >60 ML/MIN/1.73/M2
GLUCOSE SERPL-MCNC: 117 MG/DL (ref 70–110)
HBA1C MFR BLD: 7.3 % (ref 4–5.6)
POTASSIUM SERPL-SCNC: 3.5 MMOL/L (ref 3.5–5.1)
SODIUM SERPL-SCNC: 136 MMOL/L (ref 136–145)

## 2025-04-01 PROCEDURE — 83036 HEMOGLOBIN GLYCOSYLATED A1C: CPT

## 2025-04-01 PROCEDURE — 36415 COLL VENOUS BLD VENIPUNCTURE: CPT | Mod: PO

## 2025-04-01 PROCEDURE — 80048 BASIC METABOLIC PNL TOTAL CA: CPT

## 2025-04-07 ENCOUNTER — OFFICE VISIT (OUTPATIENT)
Dept: ENDOCRINOLOGY | Facility: CLINIC | Age: 85
End: 2025-04-07
Payer: MEDICARE

## 2025-04-07 VITALS
BODY MASS INDEX: 20.44 KG/M2 | SYSTOLIC BLOOD PRESSURE: 106 MMHG | WEIGHT: 140.38 LBS | HEART RATE: 78 BPM | DIASTOLIC BLOOD PRESSURE: 66 MMHG

## 2025-04-07 DIAGNOSIS — Z79.4 TYPE 2 DIABETES MELLITUS WITH HYPERGLYCEMIA, WITH LONG-TERM CURRENT USE OF INSULIN: Primary | ICD-10-CM

## 2025-04-07 DIAGNOSIS — I10 PRIMARY HYPERTENSION: ICD-10-CM

## 2025-04-07 DIAGNOSIS — E11.21 DIABETIC NEPHROPATHY ASSOCIATED WITH TYPE 2 DIABETES MELLITUS: ICD-10-CM

## 2025-04-07 DIAGNOSIS — E11.649 HYPOGLYCEMIA ASSOCIATED WITH TYPE 2 DIABETES MELLITUS: ICD-10-CM

## 2025-04-07 DIAGNOSIS — E11.65 TYPE 2 DIABETES MELLITUS WITH HYPERGLYCEMIA, WITH LONG-TERM CURRENT USE OF INSULIN: Primary | ICD-10-CM

## 2025-04-07 PROCEDURE — 99214 OFFICE O/P EST MOD 30 MIN: CPT | Mod: PBBFAC | Performed by: HOSPITALIST

## 2025-04-07 PROCEDURE — 99999 PR PBB SHADOW E&M-EST. PATIENT-LVL IV: CPT | Mod: PBBFAC,,, | Performed by: HOSPITALIST

## 2025-04-07 RX ORDER — TIRZEPATIDE 2.5 MG/.5ML
2.5 INJECTION, SOLUTION SUBCUTANEOUS
Qty: 4 PEN | Refills: 6 | Status: SHIPPED | OUTPATIENT
Start: 2025-04-07

## 2025-04-07 RX ORDER — PEN NEEDLE, DIABETIC 30 GX3/16"
NEEDLE, DISPOSABLE MISCELLANEOUS
Qty: 100 EACH | Refills: 5 | Status: SHIPPED | OUTPATIENT
Start: 2025-04-07

## 2025-04-07 RX ORDER — INSULIN GLARGINE-YFGN 100 [IU]/ML
8 INJECTION, SOLUTION SUBCUTANEOUS NIGHTLY
Qty: 15 ML | Refills: 5 | Status: SHIPPED | OUTPATIENT
Start: 2025-04-07

## 2025-04-07 RX ORDER — METFORMIN HYDROCHLORIDE 1000 MG/1
1000 TABLET ORAL
Qty: 90 TABLET | Refills: 3 | Status: SHIPPED | OUTPATIENT
Start: 2025-04-07

## 2025-04-07 NOTE — PATIENT INSTRUCTIONS
Jardiance 10 mg daily  Metformin 1000 mg daily  Basaglar 8 units nightly    MOUNJARO 2.5 mg once a week.    STOP: Januvia

## 2025-04-07 NOTE — ASSESSMENT & PLAN NOTE
- Diabetes is NOT AT GOAL, as indicated by the most recent A1C reviewed on 4/7/2025.  COMPLICATED BY FREQUENT OVERNIGHT HYPOGLYCEMIA, VERY CONCERNING  - Therapy Goals: Discussed the aim to achieve optimal control without causing hypoglycemia, Targeting an A1C of <7%.  - CGM: Freestyle Mingo 3 plus was downloaded, personally interpreted, and reviewed with the patient during this visit. See noted below.  - Diabetic Supplies and Medications: Reviewed to ensure continued refills and compliance.  - Preventive Care: Advised the patient to schedule periodic eye exams and maintain regular foot care monitoring.  - Annual Monitoring: Reviewed the importance of yearly lipid profile (with statin use) and urine protein/creatinine tests.  - patient is having issue with affording diabetes medication:  Januvia is expensive    Plan  - Changes:  Advised patient to continue low-dose insulin glargine 8 units QHS,   - Continue Jardiance 10 mg daily given diabetes nephropathy  - Start patient on Mounjaro 2.5 mg once a week injection to help with insulin resistance, will STOP Januvia  - Continue low-dose Metformin 1000 mg daily  - Dietary Modifications: Encouraged portion size control and reduction of carbohydrate intake to better manage diabetes.  - Continue CGM use given regular insulin injections daily:  Freestyle Mingo 3 plus   - Hypoglycemia Management: Discussed symptoms and treatment of hypoglycemia. The patient is informed; provided a monitoring/treatment handout.  - I offer glucagon injectables, he declined  - Clear written instructions provided on AVS. Follow-up scheduled within the next 3 months with lab work prior.   - Appointment date and time were provided to the patient today.

## 2025-04-07 NOTE — PROGRESS NOTES
Subjective:      Patient ID: Meño Garcia is a 85 y.o. male presented to [facility] today.  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 male here for  type 2 diabetes management  Other significant past medical history:  Hypertension  Current PCP No, Primary Doctor  Patient is retired pulmonologist    Interval history:  Patient presents for diabetes management, A1c currently at 7.3%  On Freestyle Mingo 3 plus CGM, doing well now, preventing hypoglycemic events      Per patient he is transferring care    Reports Basaglar insulin 16-20 units nightly.  With hypoglycemia on freestyle Mingo 2.    No symptoms of hypoglycemia overnight.  Had hypoglycemia 3:00 a.m. this morning.      Diabetes mellitus Type 2  - Known diabetic complications: nephropathy  - Diagnosed w/ DM:  Many years  - Family history of diabetes: Yes  - Hx of pancreatitis/Diabetes Related Hospitalization:  None  - Previous see by Dr. Viramontes at [facility] 2024, transferring care.  Does not have PCP, Last A1c done 10/10/2024 is 7.3%. Patient having issue paying for his medication: Januvia: Every expensive   - Frequent hypoglycemia overnight around 2-5:00 a.m.: Glucose 53, 42, 62, 46.  This a.m.: 58, 56, at 5:00 a.m. due to too much insulin, once decreasing Basaglar/long-acting.  Hypoglycemia resolved    Current reported meds:   Basaglar 8 units QHS  Jardiance 10 mg daily  Januvia 100 mg daily  Metformin 1000 mg daily  Previous meds tried:   Trulicity    >CONTINUOUS GLUCOSE MONITOR DOWNLOAD AND INTERPRETATION<:  Data was personally reviewed by myself  CGM type: Freestyle mingo 3 plus  Number of Day CGM worn:  14d, percentage of time CGM is active: 54%  Average blood glucose: 124, Glucose variability: 32.4, Glucose management indicator (GMI):  6.3%  Time in Range:  0% very high, 9% High, 81% Target Range, 7% low, 3% very low>> reviewed and discussed, goal TIR discussed with  "patient    Patient with very good glucose control in the daytime, average glucose 145, target range: 83%, 16% high, only 1% lows,  No further hypoglycemia noted  Data and Recommendation discussed with patient in clinic today  >>See CGM data scan into our system, media tab<<        Diet/Exercise:   - Eating 3  meals per day: In the meals, high carbohydrate  - Exercise:  Not very active  - Weight trend: stable  - Occupation:  Retired physician    Health maintenance/prevention:  - Hypoglycemia: Aware a treatment plan for hypoglycemia  - Statin: Taking  - ACE/ARB: Taking  - Urine microalbumin yearly, done  - Eye exam current (within one year): yes,  DR: unknown  - Check feet regularly denies cuts or ulcers on feet    Diabetes lab work  Lab Results   Component Value Date    HGBA1C 7.3 (H) 04/01/2025     No results found for: "CPEPTIDE", "GLUTAMICACID", "ISLETCELLANT"   No results found for: "FRUCTOSAMINE", "GLYCOMARKTM"  Lab Results   Component Value Date    MICALBCREAT 62.2 (H) 04/01/2025    UTPCR 0.30 (H) 01/27/2025    UTPCR 0.16 08/07/2024     Diabetes Management Status: Reviewed this office visit  Screening or Prevention Patient's value Goal Complete/Controlled?   Lipid profile : 04/11/2024 Annually No   Dilated retinal exam Most Recent Eye Exam Date: Not Found Annually Yes   Foot exam   Most Recent Foot Exam Date: Not Found Annually Yes       The patient's medications, allergies, past medical, surgical, social and family histories were reviewed and updated as appropriate.  Review of Systems: pertinent positives as per the above HPI, and otherwise negative.    Objective:   /66   Pulse 78   Wt 63.7 kg (140 lb 6.4 oz)   BMI 20.44 kg/m²   Body mass index is 20.44 kg/m².  Vital signs reviewed    Physical Exam  Vitals and nursing note reviewed.   Constitutional:       Appearance: Normal appearance. He is well-developed. He is not ill-appearing.   Neck:      Thyroid: No thyromegaly.   Pulmonary:      Effort: " "Pulmonary effort is normal. No respiratory distress.   Musculoskeletal:         General: Normal range of motion.      Cervical back: Normal range of motion.   Neurological:      General: No focal deficit present.      Mental Status: He is alert. Mental status is at baseline.   Psychiatric:         Mood and Affect: Mood normal.         Behavior: Behavior normal.       Lab Reviewed:  See results in subjective  Lab Results   Component Value Date    HGBA1C 7.3 (H) 04/01/2025     No results found for: "CHOL", "HDL", "LDLCALC", "TRIG", "CHOLHDL"  Lab Results   Component Value Date     04/01/2025    K 3.5 04/01/2025     04/01/2025    CO2 27 04/01/2025    GLUCOSE 117 (H) 04/01/2025    BUN 21 04/01/2025    CREATININE 1.0 04/01/2025    CALCIUM 9.1 04/01/2025    PHOS 3.7 01/27/2025    ALBUMIN 3.6 01/27/2025    ANIONGAP 7 (L) 04/01/2025    EGFRNORACEVR >60 04/01/2025    PTH 74.0 01/27/2025       Most recent lab work done 10/10/2024: A1c 7.3%, BUN/creatinine:  19/1.09   Cholesterol panel: Total cholesterol 130, HDL 54, triglyceride 57,   Testosterone level:  460    Assessment     1. Primary hypertension        2. Type 2 diabetes mellitus with hyperglycemia, with long-term current use of insulin  insulin glargine-yfgn 100 unit/mL (3 mL) InPn    MOUNJARO 2.5 mg/0.5 mL PnIj    Ambulatory referral/consult to Diabetes Education    empagliflozin (JARDIANCE) 10 mg tablet    pen needle, diabetic (BD ULTRA-FINE GEMA PEN NEEDLE) 32 gauge x 5/32" Ndle    metFORMIN (GLUCOPHAGE) 1000 MG tablet    Hemoglobin A1C    Basic Metabolic Panel      3. Hypoglycemia associated with type 2 diabetes mellitus        4. Diabetic nephropathy associated with type 2 diabetes mellitus          Plan     Type 2 diabetes mellitus with hyperglycemia, with long-term current use of insulin  - Diabetes is NOT AT GOAL, as indicated by the most recent A1C reviewed on 4/7/2025.  COMPLICATED BY FREQUENT OVERNIGHT HYPOGLYCEMIA, VERY CONCERNING  - Therapy " Goals: Discussed the aim to achieve optimal control without causing hypoglycemia, Targeting an A1C of <7%.  - CGM: Freestyle Mingo 3 plus was downloaded, personally interpreted, and reviewed with the patient during this visit. See noted below.  - Diabetic Supplies and Medications: Reviewed to ensure continued refills and compliance.  - Preventive Care: Advised the patient to schedule periodic eye exams and maintain regular foot care monitoring.  - Annual Monitoring: Reviewed the importance of yearly lipid profile (with statin use) and urine protein/creatinine tests.  - patient is having issue with affording diabetes medication:  Januvia is expensive    Plan  - Changes:  Advised patient to continue low-dose insulin glargine 8 units QHS,   - Continue Jardiance 10 mg daily given diabetes nephropathy  - Start patient on Mounjaro 2.5 mg once a week injection to help with insulin resistance, will STOP Januvia  - Continue low-dose Metformin 1000 mg daily  - Dietary Modifications: Encouraged portion size control and reduction of carbohydrate intake to better manage diabetes.  - Continue CGM use given regular insulin injections daily:  Freestyle Mingo 3 plus   - Hypoglycemia Management: Discussed symptoms and treatment of hypoglycemia. The patient is informed; provided a monitoring/treatment handout.  - I offer glucagon injectables, he declined  - Clear written instructions provided on AVS. Follow-up scheduled within the next 3 months with lab work prior.   - Appointment date and time were provided to the patient today.     Primary hypertension  - monitor blood pressure    Hypoglycemia associated with type 2 diabetes mellitus  - continue benefit from long-term CGM use to prevent hypoglycemia     Diabetic nephropathy associated with type 2 diabetes mellitus  - sees Nephrology   - monitor renal function, continue Jardiance      Advised patient to follow up with PCP for routine health maintenance care.   RTC in 4-6  months    Visit today included increased complexity associated with the care of the episodic problem addressed and managing the longitudinal care of the patient due to the serious and/or complex managed problem(s).   Including: Type 2 diabetes, hypertension, hyperlipidemia, diabetes nephropathy    Stan Wise M.D.  Endocrinology  [facility]  4/7/2025      Disclaimer: This note has been generated in part with the use of voice-recognition software. There may be typographical errors that have been missed during proof-reading.  -------------------------------------------------------------------------------------------------  Indications for CGMs:    Patent with diagnosed: Diabetes Mellitus that required frequent glucose monitoring (4 + times a day and 4x/day testing), frequent adjustments to insulin regiment based on BG or CGM results. Patent requires a therapeutic CGM and is willing to use therapeutic CGM/SMBG for Necessary frequent adjustments in insulin therapy, patient demonstrated an understanding of the technology (can use and recognize alerts and alarms). I, the physician, have assessed adherence to CGM regimen and to the plan, patient will have close follow up in clinic as indicated, every 3 months to 6 months.     Patient experiences (including but not limited to):  [x]   Discrepancies between records and A1C, at risk severe hypoglycemia episode and hospitalization  [x]   Recurrent, unexplained, severe hypoglycemic episodes  [x]   Postprandial hyperglycemia  [x]   Unexplained blood glucose excursions  [x]   Impaired awareness of hypoglycemia    []   Patient uses insulin pump for diabetes management: will need frequent adjustments to insulin regiment based on BG: including adjustment to  insulin pump setting, sliding scale, correction factor, additional bolusing/correction    I believe that the patient will greatly benefit from wearing CGM because this will allow for better glucose  control, help to avoid high/lows and prevent hospitalization.  This also is safer for patient in using CGM during the COVID public health emergency.

## 2025-04-23 ENCOUNTER — OFFICE VISIT (OUTPATIENT)
Dept: CARDIOLOGY | Facility: CLINIC | Age: 85
End: 2025-04-23
Payer: MEDICARE

## 2025-04-23 VITALS
BODY MASS INDEX: 20.38 KG/M2 | HEART RATE: 76 BPM | OXYGEN SATURATION: 97 % | SYSTOLIC BLOOD PRESSURE: 122 MMHG | DIASTOLIC BLOOD PRESSURE: 76 MMHG | WEIGHT: 140 LBS

## 2025-04-23 DIAGNOSIS — Q21.12 PFO (PATENT FORAMEN OVALE): ICD-10-CM

## 2025-04-23 DIAGNOSIS — R06.00 DYSPNEA, UNSPECIFIED TYPE: ICD-10-CM

## 2025-04-23 DIAGNOSIS — I45.10 RBBB: Primary | ICD-10-CM

## 2025-04-23 DIAGNOSIS — I10 PRIMARY HYPERTENSION: ICD-10-CM

## 2025-04-23 PROCEDURE — 99999 PR PBB SHADOW E&M-EST. PATIENT-LVL III: CPT | Mod: PBBFAC,,, | Performed by: INTERNAL MEDICINE

## 2025-04-23 PROCEDURE — 99213 OFFICE O/P EST LOW 20 MIN: CPT | Mod: PBBFAC | Performed by: INTERNAL MEDICINE

## 2025-04-23 PROCEDURE — 99214 OFFICE O/P EST MOD 30 MIN: CPT | Mod: S$PBB,,, | Performed by: INTERNAL MEDICINE

## 2025-04-23 NOTE — PROGRESS NOTES
Cardiology    4/23/2025  11:37 AM    Problem list  Problem List[1]    History of Present Illness    Patient presents today for follow up after a fall. He sustained a back injury with compression fracture while playing pickleball. He was evaluated by Dr. Nunez who noted improvement in condition. He has started physical therapy and is currently on day 2 of the program. He monitors BP at home and reports experiencing issues with BP when taking Carvedilol from [facility].  He has not had any further problems with carvedilol since purchasing from a different pharmacy ([facility]).  His morning BP was 120/85. He reports starting Losartan from [facility] today.   Discussed echocardiogram and PFT results.  Copies of these results provided to him.          Medications  Current Medications[2]   Prior to Admission medications    Medication Sig Start Date End Date Taking? Authorizing Provider   amLODIPine (NORVASC) 10 MG tablet Take 10 mg by mouth once daily. 8/26/23  Yes Provider, Historical   aspirin (ECOTRIN) 81 MG EC tablet 81 mg once daily.   Yes Provider, Historical   atorvastatin (LIPITOR) 10 MG tablet 10 mg. Mon and Fri   Yes Provider, Historical   bisacodyL (DULCOLAX, BISACODYL,) 5 mg EC tablet 5 mg once daily.   Yes Provider, Historical   carvediloL (COREG) 6.25 MG tablet Take 6.25 mg by mouth 2 (two) times daily.   Yes Provider, Historical   clobetasol 0.05% (TEMOVATE) 0.05 % Oint Apply 1 g topically 2 (two) times daily. 12/4/24  Yes Provider, Historical   docusate sodium (COLACE) 100 MG capsule 100 mg 2 (two) times a day.   Yes Provider, Historical   empagliflozin (JARDIANCE) 10 mg tablet Take 1 tablet (10 mg total) by mouth once daily. 4/7/25  Yes Stan Wise MD   ergocalciferol (ERGOCALCIFEROL) 50,000 unit Cap Take 50,000 Units by mouth every 7 days. 5/18/24  Yes Provider, Historical   famotidine (PEPCID) 40 MG tablet Take 40 mg by mouth once daily. 8/26/23  Yes Provider, Historical   FREESTYLE ELVER  "3 PLUS SENSOR Magy Use 1 sensor for every 15 days, (2 sensors a month),  ICD10: E11.65 11/14/24  Yes Stan Wise MD   insulin glargine-yfgn 100 unit/mL (3 mL) InPn Inject 8 Units into the skin every evening. 4/7/25  Yes Stan Wise MD   losartan (COZAAR) 100 MG tablet Take 1 tablet (100 mg total) by mouth once daily. 2/15/24  Yes Noe Olivia MD   metFORMIN (GLUCOPHAGE) 1000 MG tablet Take 1 tablet (1,000 mg total) by mouth daily with breakfast. 4/7/25  Yes Stan Wise MD   MOUNJARO 2.5 mg/0.5 mL PnIj Inject 2.5 mg into the skin every 7 days. 4/7/25  Yes Stan Wise MD   pen needle, diabetic (BD ULTRA-FINE GEMA PEN NEEDLE) 32 gauge x 5/32" Ndle Use needles to inject insulin daily times a day. ICD-10: E11.65 4/7/25  Yes Stan Wise MD   tacrolimus (PROTOPIC) 0.1 % ointment Apply topically. 12/7/24  Yes Provider, Historical         History  Past Medical History:   Diagnosis Date    HTN (hypertension)     Patent foramen ovale     T2DM (type 2 diabetes mellitus)      Past Surgical History:   Procedure Laterality Date    OPEN REDUCTION AND INTERNAL FIXATION (ORIF) OF INJURY OF SHOULDER  2016     Social History[3]      Allergies  Review of patient's allergies indicates:   Allergen Reactions    Ancef [cefazolin]     Cephalosporins     Flomax [tamsulosin]      wheezing    Valsartan      Headache           Review of Systems   Review of Systems   Constitutional: Negative for decreased appetite, fever and weight loss.   HENT:  Negative for congestion and nosebleeds.    Eyes:  Negative for double vision, vision loss in left eye, vision loss in right eye and visual disturbance.   Cardiovascular:  Negative for chest pain, claudication, cyanosis, dyspnea on exertion, irregular heartbeat, leg swelling, near-syncope, orthopnea, palpitations, paroxysmal nocturnal dyspnea and syncope.   Respiratory:  Negative for cough, hemoptysis, shortness of breath, sleep disturbances due to breathing, snoring, sputum " production and wheezing.    Endocrine: Negative for cold intolerance and heat intolerance.   Skin:  Negative for nail changes and rash.   Musculoskeletal:  Negative for joint pain, muscle cramps, muscle weakness and myalgias.   Gastrointestinal:  Negative for change in bowel habit, heartburn, hematemesis, hematochezia, hemorrhoids and melena.   Neurological:  Negative for dizziness, focal weakness and headaches.         Physical Exam  Wt Readings from Last 1 Encounters:   04/23/25 63.5 kg (140 lb)     BP Readings from Last 3 Encounters:   04/23/25 122/76   04/07/25 106/66   02/05/25 110/74     Pulse Readings from Last 1 Encounters:   04/23/25 76     Body mass index is 20.38 kg/m².    Physical Exam  Vitals reviewed.   Constitutional:       Appearance: He is well-developed.   HENT:      Head: Atraumatic.   Eyes:      General: No scleral icterus.  Neck:      Vascular: Normal carotid pulses. No carotid bruit, hepatojugular reflux or JVD.   Cardiovascular:      Rate and Rhythm: Normal rate and regular rhythm.      Chest Wall: PMI is not displaced.      Pulses: Intact distal pulses.           Carotid pulses are 2+ on the right side and 2+ on the left side.       Radial pulses are 2+ on the right side and 2+ on the left side.        Dorsalis pedis pulses are 2+ on the right side and 2+ on the left side.      Heart sounds: Normal heart sounds, S1 normal and S2 normal. No murmur heard.     No friction rub.   Pulmonary:      Effort: Pulmonary effort is normal. No respiratory distress.      Breath sounds: Normal breath sounds. No stridor. No wheezing or rales.   Chest:      Chest wall: No tenderness.   Abdominal:      General: Bowel sounds are normal.      Palpations: Abdomen is soft.   Musculoskeletal:      Cervical back: Neck supple. No edema.   Skin:     General: Skin is warm and dry.      Nails: There is no clubbing.   Neurological:      Mental Status: He is alert and oriented to person, place, and time.   Psychiatric:          Behavior: Behavior normal.         Thought Content: Thought content normal.           Assessment & Plan    I45.10 RBBB  Q21.12 PFO (patent foramen ovale)  I10 Primary hypertension  R06.00 Dyspnea, unspecified type    PFO (PATENT FORAMEN OVALE):  - Echocardiogram results show normal EF and valve status.  - Atrial septal aneurysm present, no intervention necessary.    PRIMARY HYPERTENSION:  - Blood pressure is well-controlled.  - Continued Losartan for blood pressure control.    LIFESTYLE CHANGES:  - Patient to continue with physical therapy program for back rehabilitation.    - Current cardiac management plan maintained based on stable condition.    - Follow up in 6 months.             Noe Olivia MD, F.A.C.C, F.S.C.A.I.      Total professional time spent for the encounter: 25 minutes  Time was spent preparing to see the patient, reviewing results of prior testing, obtaining and/or reviewing separately obtained history, performing a medically appropriate examination and interview, counseling and educating the patient/family, ordering medications/tests/procedures, referring and communicating with other health care professionals, documenting clinical information in the electronic health record, and independently interpreting results.    This note was generated with the assistance of ambient listening technology. Verbal consent was obtained by the patient and accompanying visitor(s) for the recording of patient appointment to facilitate this note. I attest to having reviewed and edited the generated note for accuracy, though some syntax or spelling errors may persist. Please contact the author of this note for any clarification.           [1]   Patient Active Problem List  Diagnosis    Diabetes mellitus type II, controlled    PFO (patent foramen ovale)    Primary hypertension    Dyspnea    Gastroesophageal reflux disease without esophagitis    RBBB    Diabetic nephropathy associated with type 2 diabetes  "mellitus    Stage 3a chronic kidney disease    Hyponatremia    Anemia    Type 2 diabetes mellitus with hyperglycemia, with long-term current use of insulin    Hypoglycemia associated with type 2 diabetes mellitus    Renal cyst   [2]   Current Outpatient Medications   Medication Sig Dispense Refill    amLODIPine (NORVASC) 10 MG tablet Take 10 mg by mouth once daily.      aspirin (ECOTRIN) 81 MG EC tablet 81 mg once daily.      atorvastatin (LIPITOR) 10 MG tablet 10 mg. Mon and Fri      bisacodyL (DULCOLAX, BISACODYL,) 5 mg EC tablet 5 mg once daily.      carvediloL (COREG) 6.25 MG tablet Take 6.25 mg by mouth 2 (two) times daily.      clobetasol 0.05% (TEMOVATE) 0.05 % Oint Apply 1 g topically 2 (two) times daily.      docusate sodium (COLACE) 100 MG capsule 100 mg 2 (two) times a day.      empagliflozin (JARDIANCE) 10 mg tablet Take 1 tablet (10 mg total) by mouth once daily. 90 tablet 3    ergocalciferol (ERGOCALCIFEROL) 50,000 unit Cap Take 50,000 Units by mouth every 7 days.      famotidine (PEPCID) 40 MG tablet Take 40 mg by mouth once daily.      FREESTYLE ELVER 3 PLUS SENSOR Magy Use 1 sensor for every 15 days, (2 sensors a month),  ICD10: E11.65 2 each 11    insulin glargine-yfgn 100 unit/mL (3 mL) InPn Inject 8 Units into the skin every evening. 15 mL 5    losartan (COZAAR) 100 MG tablet Take 1 tablet (100 mg total) by mouth once daily. 90 tablet 3    metFORMIN (GLUCOPHAGE) 1000 MG tablet Take 1 tablet (1,000 mg total) by mouth daily with breakfast. 90 tablet 3    MOUNJARO 2.5 mg/0.5 mL PnIj Inject 2.5 mg into the skin every 7 days. 4 Pen 6    pen needle, diabetic (BD ULTRA-FINE GEMA PEN NEEDLE) 32 gauge x 5/32" Ndle Use needles to inject insulin daily times a day. ICD-10: E11.65 100 each 5    tacrolimus (PROTOPIC) 0.1 % ointment Apply topically.       No current facility-administered medications for this visit.   [3]   Social History  Socioeconomic History    Marital status:    Tobacco Use    Smoking " status: Never    Smokeless tobacco: Never   Substance and Sexual Activity    Alcohol use: Yes     Comment: occ    Drug use: Never     Social Drivers of Health     Financial Resource Strain: Low Risk  (4/6/2025)    Overall Financial Resource Strain (CARDIA)     Difficulty of Paying Living Expenses: Not hard at all   Food Insecurity: No Food Insecurity (4/6/2025)    Hunger Vital Sign     Worried About Running Out of Food in the Last Year: Never true     Ran Out of Food in the Last Year: Never true   Transportation Needs: No Transportation Needs (4/6/2025)    PRAPARE - Transportation     Lack of Transportation (Medical): No     Lack of Transportation (Non-Medical): No   Physical Activity: Inactive (4/6/2025)    Exercise Vital Sign     Days of Exercise per Week: 0 days     Minutes of Exercise per Session: 0 min   Stress: No Stress Concern Present (4/6/2025)    [employer] [place] [employer] - Occupational Stress Questionnaire     Feeling of Stress : Not at all   Housing Stability: Low Risk  (4/6/2025)    Housing Stability Vital Sign     Unable to Pay for Housing in the Last Year: No     Number of Times Moved in the Last Year: 0     Homeless in the Last Year: No

## 2025-05-21 ENCOUNTER — LAB VISIT (OUTPATIENT)
Dept: LAB | Facility: HOSPITAL | Age: 85
End: 2025-05-21
Attending: INTERNAL MEDICINE
Payer: MEDICARE

## 2025-05-21 DIAGNOSIS — E11.21 DIABETIC NEPHROPATHY ASSOCIATED WITH TYPE 2 DIABETES MELLITUS: ICD-10-CM

## 2025-05-21 DIAGNOSIS — E87.1 HYPONATREMIA: ICD-10-CM

## 2025-05-21 DIAGNOSIS — N18.31 STAGE 3A CHRONIC KIDNEY DISEASE: ICD-10-CM

## 2025-05-21 LAB
ABSOLUTE EOSINOPHIL (OHS): 1 K/UL
ABSOLUTE MONOCYTE (OHS): 0.78 K/UL (ref 0.3–1)
ABSOLUTE NEUTROPHIL COUNT (OHS): 4.58 K/UL (ref 1.8–7.7)
ALBUMIN SERPL BCP-MCNC: 3.5 G/DL (ref 3.5–5.2)
ANION GAP (OHS): 9 MMOL/L (ref 8–16)
BACTERIA #/AREA URNS AUTO: NORMAL /HPF
BASOPHILS # BLD AUTO: 0.17 K/UL
BASOPHILS NFR BLD AUTO: 1.9 %
BILIRUB UR QL STRIP.AUTO: NEGATIVE
BUN SERPL-MCNC: 14 MG/DL (ref 8–23)
CALCIUM SERPL-MCNC: 8.8 MG/DL (ref 8.7–10.5)
CHLORIDE SERPL-SCNC: 106 MMOL/L (ref 95–110)
CLARITY UR: CLEAR
CO2 SERPL-SCNC: 22 MMOL/L (ref 23–29)
COLOR UR AUTO: YELLOW
CREAT SERPL-MCNC: 1.1 MG/DL (ref 0.5–1.4)
CREAT UR-MCNC: 77 MG/DL (ref 23–375)
ERYTHROCYTE [DISTWIDTH] IN BLOOD BY AUTOMATED COUNT: 14.2 % (ref 11.5–14.5)
GFR SERPLBLD CREATININE-BSD FMLA CKD-EPI: >60 ML/MIN/1.73/M2
GLUCOSE SERPL-MCNC: 183 MG/DL (ref 70–110)
GLUCOSE UR QL STRIP: ABNORMAL
HCT VFR BLD AUTO: 43.2 % (ref 40–54)
HGB BLD-MCNC: 13.7 GM/DL (ref 14–18)
HGB UR QL STRIP: NEGATIVE
IMM GRANULOCYTES # BLD AUTO: 0.01 K/UL (ref 0–0.04)
IMM GRANULOCYTES NFR BLD AUTO: 0.1 % (ref 0–0.5)
KETONES UR QL STRIP: NEGATIVE
LEUKOCYTE ESTERASE UR QL STRIP: NEGATIVE
LYMPHOCYTES # BLD AUTO: 2.26 K/UL (ref 1–4.8)
MCH RBC QN AUTO: 27 PG (ref 27–31)
MCHC RBC AUTO-ENTMCNC: 31.7 G/DL (ref 32–36)
MCV RBC AUTO: 85 FL (ref 82–98)
MICROSCOPIC COMMENT: NORMAL
NITRITE UR QL STRIP: NEGATIVE
NUCLEATED RBC (/100WBC) (OHS): 0 /100 WBC
PH UR STRIP: 7 [PH]
PHOSPHATE SERPL-MCNC: 3.5 MG/DL (ref 2.7–4.5)
PLATELET # BLD AUTO: 253 K/UL (ref 150–450)
PMV BLD AUTO: 9.8 FL (ref 9.2–12.9)
POTASSIUM SERPL-SCNC: 3.9 MMOL/L (ref 3.5–5.1)
PROT UR QL STRIP: NEGATIVE
PROT UR-MCNC: 18 MG/DL
PROT/CREAT UR: 0.23 MG/G{CREAT}
PTH-INTACT SERPL-MCNC: 64.8 PG/ML (ref 9–77)
RBC # BLD AUTO: 5.08 M/UL (ref 4.6–6.2)
RBC #/AREA URNS AUTO: 1 /HPF (ref 0–4)
RELATIVE EOSINOPHIL (OHS): 11.4 %
RELATIVE LYMPHOCYTE (OHS): 25.7 % (ref 18–48)
RELATIVE MONOCYTE (OHS): 8.9 % (ref 4–15)
RELATIVE NEUTROPHIL (OHS): 52 % (ref 38–73)
SODIUM SERPL-SCNC: 137 MMOL/L (ref 136–145)
SP GR UR STRIP: 1.02
SQUAMOUS #/AREA URNS AUTO: <1 /HPF
URATE SERPL-MCNC: 3.2 MG/DL (ref 3.4–7)
UROBILINOGEN UR STRIP-ACNC: NEGATIVE EU/DL
WBC # BLD AUTO: 8.8 K/UL (ref 3.9–12.7)
WBC #/AREA URNS AUTO: 2 /HPF (ref 0–5)
YEAST UR QL AUTO: NORMAL /HPF

## 2025-05-21 PROCEDURE — 84550 ASSAY OF BLOOD/URIC ACID: CPT

## 2025-05-21 PROCEDURE — 85025 COMPLETE CBC W/AUTO DIFF WBC: CPT

## 2025-05-21 PROCEDURE — 36415 COLL VENOUS BLD VENIPUNCTURE: CPT | Mod: PO

## 2025-05-21 PROCEDURE — 83970 ASSAY OF PARATHORMONE: CPT

## 2025-05-21 PROCEDURE — 81001 URINALYSIS AUTO W/SCOPE: CPT

## 2025-05-21 PROCEDURE — 84156 ASSAY OF PROTEIN URINE: CPT

## 2025-05-21 PROCEDURE — 82565 ASSAY OF CREATININE: CPT

## 2025-05-28 ENCOUNTER — OFFICE VISIT (OUTPATIENT)
Dept: NEPHROLOGY | Facility: CLINIC | Age: 85
End: 2025-05-28
Payer: MEDICARE

## 2025-05-28 VITALS
WEIGHT: 138.88 LBS | HEIGHT: 70 IN | SYSTOLIC BLOOD PRESSURE: 108 MMHG | RESPIRATION RATE: 18 BRPM | OXYGEN SATURATION: 96 % | DIASTOLIC BLOOD PRESSURE: 68 MMHG | BODY MASS INDEX: 19.88 KG/M2 | HEART RATE: 71 BPM

## 2025-05-28 DIAGNOSIS — D64.9 ANEMIA, UNSPECIFIED TYPE: ICD-10-CM

## 2025-05-28 DIAGNOSIS — E87.1 HYPONATREMIA: ICD-10-CM

## 2025-05-28 DIAGNOSIS — E11.21 DIABETIC NEPHROPATHY ASSOCIATED WITH TYPE 2 DIABETES MELLITUS: Primary | ICD-10-CM

## 2025-05-28 DIAGNOSIS — N28.1 RENAL CYST: ICD-10-CM

## 2025-05-28 DIAGNOSIS — I10 ESSENTIAL HYPERTENSION: ICD-10-CM

## 2025-05-28 DIAGNOSIS — N18.31 STAGE 3A CHRONIC KIDNEY DISEASE: ICD-10-CM

## 2025-05-28 PROCEDURE — 99214 OFFICE O/P EST MOD 30 MIN: CPT | Mod: PBBFAC | Performed by: INTERNAL MEDICINE

## 2025-05-28 PROCEDURE — 99999 PR PBB SHADOW E&M-EST. PATIENT-LVL IV: CPT | Mod: PBBFAC,,, | Performed by: INTERNAL MEDICINE

## 2025-05-28 RX ORDER — AMLODIPINE BESYLATE 5 MG/1
5 TABLET ORAL DAILY
Qty: 90 TABLET | Refills: 3 | Status: SHIPPED | OUTPATIENT
Start: 2025-05-28

## 2025-05-28 RX ORDER — FERROUS SULFATE 325(65) MG
325 TABLET ORAL DAILY
COMMUNITY
Start: 2025-04-06

## 2025-05-28 NOTE — PROGRESS NOTES
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 His baseline Cr appears to be 0.9-1.0 with GFR > 60%; last at baseline 3/2024. He first noted microalbuminuria in 1/2024. ACR 39 on 1/12/24 and ACR 43 in 4/11/24.   Labs also with intermittent hyponatremia since at least 2016; Na 134 today. He drinks 7-8 cups of water per day.      He was diagnosed with T2DM in the 1990s. Last A1c was 7.7. Tracks his glucose levels which range from 50-upper 200s. He notes nocturnal hypoglycemia. He has never been on a SGLT2i.   He reports good water intake; 2-3 bottles per day. He is prescribed HCTZ PRN for edema, but hasn't taken in several months. No edema on exam.   He checks his BP qAM prior to antihypertensives.     8/15/24: UPCR negative, Na 132. Recommended tracking fluid intake.   He was started on Jardiance in 11/2024.      Interval Hx:   LV 2/5/25: UA with yeast; prescribed diflucan.   Today he reports to be doing okay. Recently had a fall while playing pickleball.   Notes decreased energy lately. Home -150s prior to taking amlodipine, coreg, and losartan. Interested in decreasing amlodipine dose.  No leg swelling.       ROS:  Complete ROS otherwise negative except as indicated above.       Current Medications[1]      Vitals: Blood pressure 108/68, pulse 71, resp. rate 18, height 5' 9.5" (1.765 m), weight 63 kg (138 lb 14.2 oz), SpO2 96%. Body mass index is 20.22 kg/m².    Physical Exam  Vitals reviewed.   Constitutional:       General: He is awake. He is not in acute distress.     Appearance: Normal appearance. He is well-developed.   HENT:      Head: Normocephalic and atraumatic.      Nose: Nose normal.      Mouth/Throat:      Mouth: Mucous membranes are moist.   Eyes: "      Extraocular Movements: Extraocular movements intact.      Conjunctiva/sclera: Conjunctivae normal.   Pulmonary:      Effort: Pulmonary effort is normal.   Musculoskeletal:         General: No tenderness or signs of injury.      Right lower leg: No edema.      Left lower leg: No edema.   Skin:     General: Skin is warm and dry.      Findings: No erythema or rash.   Neurological:      General: No focal deficit present.      Mental Status: He is alert. Mental status is at baseline.   Psychiatric:         Mood and Affect: Mood normal.         Behavior: Behavior normal.           Labs/Imaging:  Sodium   Date Value Ref Range Status   05/21/2025 137 136 - 145 mmol/L Final   04/01/2025 136 136 - 145 mmol/L Final   01/27/2025 136 136 - 145 mmol/L Final   08/07/2024 134 (L) 136 - 145 mmol/L Final     Potassium   Date Value Ref Range Status   05/21/2025 3.9 3.5 - 5.1 mmol/L Final   04/01/2025 3.5 3.5 - 5.1 mmol/L Final   01/27/2025 4.3 3.5 - 5.1 mmol/L Final   08/07/2024 4.4 3.5 - 5.1 mmol/L Final     Chloride   Date Value Ref Range Status   05/21/2025 106 95 - 110 mmol/L Final   04/01/2025 102 95 - 110 mmol/L Final   01/27/2025 108 95 - 110 mmol/L Final   08/07/2024 104 95 - 110 mmol/L Final     CO2   Date Value Ref Range Status   05/21/2025 22 (L) 23 - 29 mmol/L Final   04/01/2025 27 23 - 29 mmol/L Final   01/27/2025 20 (L) 23 - 29 mmol/L Final   08/07/2024 23 23 - 29 mmol/L Final     BUN   Date Value Ref Range Status   05/21/2025 14 8 - 23 mg/dL Final   04/01/2025 21 8 - 23 mg/dL Final   01/27/2025 22 8 - 23 mg/dL Final   08/07/2024 15 8 - 23 mg/dL Final     Creatinine   Date Value Ref Range Status   05/21/2025 1.1 0.5 - 1.4 mg/dL Final   04/01/2025 1.0 0.5 - 1.4 mg/dL Final   01/27/2025 1.3 0.5 - 1.4 mg/dL Final   08/07/2024 1.2 0.5 - 1.4 mg/dL Final     eGFR   Date Value Ref Range Status   05/21/2025 >60 >60 mL/min/1.73/m2 Final     Comment:     Estimated GFR calculated using the CKD-EPI creatinine (2021) equation.    04/01/2025 >60 >60 mL/min/1.73/m2 Final     Comment:     Estimated GFR calculated using the CKD-EPI creatinine (2021) equation.   01/27/2025 54.2 (A) >60 mL/min/1.73 m^2 Final   08/07/2024 59.6 (A) >60 mL/min/1.73 m^2 Final     Calcium   Date Value Ref Range Status   05/21/2025 8.8 8.7 - 10.5 mg/dL Final   04/01/2025 9.1 8.7 - 10.5 mg/dL Final   01/27/2025 8.9 8.7 - 10.5 mg/dL Final   08/07/2024 9.0 8.7 - 10.5 mg/dL Final     Phosphorus Level   Date Value Ref Range Status   05/21/2025 3.5 2.7 - 4.5 mg/dL Final     Phosphorus   Date Value Ref Range Status   01/27/2025 3.7 2.7 - 4.5 mg/dL Final   08/07/2024 3.7 2.7 - 4.5 mg/dL Final     Albumin   Date Value Ref Range Status   05/21/2025 3.5 3.5 - 5.2 g/dL Final   01/27/2025 3.6 3.5 - 5.2 g/dL Final   08/07/2024 3.6 3.5 - 5.2 g/dL Final       PTH, Intact   Date Value Ref Range Status   01/27/2025 74.0 9.0 - 77.0 pg/mL Final   08/07/2024 51.8 9.0 - 77.0 pg/mL Final     PTH Intact   Date Value Ref Range Status   05/21/2025 64.8 9.0 - 77.0 pg/mL Final     Uric Acid   Date Value Ref Range Status   01/27/2025 3.8 3.4 - 7.0 mg/dL Final   08/07/2024 4.1 3.4 - 7.0 mg/dL Final       Hemoglobin   Date Value Ref Range Status   01/27/2025 12.9 (L) 14.0 - 18.0 g/dL Final   08/07/2024 12.6 (L) 14.0 - 18.0 g/dL Final     HGB   Date Value Ref Range Status   05/21/2025 13.7 (L) 14.0 - 18.0 gm/dL Final       Urine Protein/Creatinine Ratio   Date Value Ref Range Status   05/21/2025 0.23 (H) <=0.20 Final     Prot/Creat Ratio, Urine   Date Value Ref Range Status   01/27/2025 0.30 (H) 0.00 - 0.20 Final   08/07/2024 0.16 0.00 - 0.20 Final       1/27/25: SOsm 297, UOsm 253, Haile < 10     4/11/24: ACR 43  1/12/24: Cr 0.94, GFR 80, Na 139. A1c 7.7. ACR 39.   3/2/23: Cr 1.01, GFR 74%     Renal US: 11/16/24:   Right kidney: The right kidney measures 9.7 cm. Echogenic cortex.  Resistive index measures 0.78.  Upper pole simple cysts largest of which measures 4.3 cm.  No stones, mass, or  hydronephrosis.     Left kidney: The left kidney measures 10.4 cm. No cortical thinning or loss of corticomedullary distinction.  Resistive index measures 0.65.  No stones, mass, or hydronephrosis.     Urinary bladder decompressed.         Impression/Plan:    Diabetic nephropathy associated with type 2 diabetes mellitus  - microalbuminuria first noted 1/2024; due to diabetic nephropathy  - UPCR 0.30 --> 0.23 today; stable  - continue max dose losartan and Jardiance  - will add Kerendia to regimen if UPCR ever > 0.5    Stage 3a chronic kidney disease  - baseline Cr 1.0-1.3 with GFR 54->60%; due to diabetic nephropathy  - Cr 1.3 --> 1.1 today; stable and at baseline  - continue good water intake while monitoring Na levels  - avoid hypotension    Hyponatremia  - intermittently present since 2016  - Na 136 --> 137 today; normal  - continue good water intake. Will trend.     Anemia  - hgb 12.9 --> 13.7; normal    Renal cyst  - renal US with 4.3cm simple cyst  - will trend q 2 years or so given large size; next 11/2026    Essential hypertension  - soft BP in clinic in setting of recent fall  - decreasing amlodipine from 10mg to 5mg daily  - continue ambulatory BP monitoring        Visit today included increased complexity associated with the care of the episodic problem HTN addressed and managing the longitudinal care of the patient due to the serious and/or complex managed problem(s) CKD.      Treatment options and plan were discussed with the patient and/or caregiver.   RTC 6 months.       Emili Torres MD  [facility]  [phone]         [1]   Current Outpatient Medications:     aspirin (ECOTRIN) 81 MG EC tablet, 81 mg once daily., Disp: , Rfl:     atorvastatin (LIPITOR) 10 MG tablet, 10 mg. Mon and Fri, Disp: , Rfl:     bisacodyL (DULCOLAX, BISACODYL,) 5 mg EC tablet, 5 mg once daily., Disp: , Rfl:     carvediloL (COREG) 6.25 MG tablet, Take 6.25 mg by mouth 2 (two) times daily., Disp: , Rfl:     docusate  "sodium (COLACE) 100 MG capsule, 100 mg 2 (two) times a day., Disp: , Rfl:     empagliflozin (JARDIANCE) 10 mg tablet, Take 1 tablet (10 mg total) by mouth once daily., Disp: 90 tablet, Rfl: 3    ergocalciferol (ERGOCALCIFEROL) 50,000 unit Cap, Take 50,000 Units by mouth every 7 days., Disp: , Rfl:     famotidine (PEPCID) 40 MG tablet, Take 40 mg by mouth once daily., Disp: , Rfl:     ferrous sulfate (IRON, FERROUS SULFATE,) 325 mg (65 mg iron) Tab tablet, Take 325 mg by mouth once daily., Disp: , Rfl:     [employer] ELVER 3 PLUS SENSOR Magy, Use 1 sensor for every 15 days, (2 sensors a month),  ICD10: E11.65, Disp: 2 each, Rfl: 11    losartan (COZAAR) 100 MG tablet, Take 1 tablet (100 mg total) by mouth once daily., Disp: 90 tablet, Rfl: 3    metFORMIN (GLUCOPHAGE) 1000 MG tablet, Take 1 tablet (1,000 mg total) by mouth daily with breakfast., Disp: 90 tablet, Rfl: 3    MOUNJARO 2.5 mg/0.5 mL PnIj, Inject 2.5 mg (one pen) into the skin every 7 days., Disp: 4 Pen, Rfl: 6    pen needle, diabetic (BD ULTRA-FINE GEMA PEN NEEDLE) 32 gauge x 5/32" Ndle, Use needles to inject insulin daily times a day. ICD-10: E11.65, Disp: 100 each, Rfl: 5    amLODIPine (NORVASC) 5 MG tablet, Take 1 tablet (5 mg total) by mouth once daily., Disp: 90 tablet, Rfl: 3    clobetasol 0.05% (TEMOVATE) 0.05 % Oint, Apply 1 g topically 2 (two) times daily. (Patient not taking: Reported on 5/28/2025), Disp: , Rfl:     insulin glargine-yfgn 100 unit/mL (3 mL) InPn, Inject 8 Units into the skin every evening. (Patient not taking: Reported on 5/28/2025), Disp: 15 mL, Rfl: 5    tacrolimus (PROTOPIC) 0.1 % ointment, Apply topically. (Patient not taking: Reported on 5/28/2025), Disp: , Rfl:     "

## 2025-05-28 NOTE — ASSESSMENT & PLAN NOTE
- microalbuminuria first noted 1/2024; due to diabetic nephropathy  - UPCR 0.30 --> 0.23 today; stable  - continue max dose losartan and Jardiance  - will add Kerendia to regimen if UPCR ever > 0.5

## 2025-05-28 NOTE — ASSESSMENT & PLAN NOTE
- soft BP in clinic in setting of recent fall  - decreasing amlodipine from 10mg to 5mg daily  - continue ambulatory BP monitoring

## 2025-05-28 NOTE — ASSESSMENT & PLAN NOTE
- baseline Cr 1.0-1.3 with GFR 54->60%; due to diabetic nephropathy  - Cr 1.3 --> 1.1 today; stable and at baseline  - continue good water intake while monitoring Na levels  - avoid hypotension

## 2025-05-28 NOTE — ASSESSMENT & PLAN NOTE
- intermittently present since 2016  - Na 136 --> 137 today; normal  - continue good water intake. Will trend.

## 2025-07-23 ENCOUNTER — TELEPHONE (OUTPATIENT)
Dept: PAIN MEDICINE | Facility: CLINIC | Age: 85
End: 2025-07-23
Payer: MEDICARE

## 2025-07-23 NOTE — TELEPHONE ENCOUNTER
Spoke with patient. Confirmed appointment location & time on 07/24/25  with Dr. Hancock.  Patient expressed understanding & gratitude. Call ended.

## 2025-07-24 ENCOUNTER — OFFICE VISIT (OUTPATIENT)
Dept: PAIN MEDICINE | Facility: CLINIC | Age: 85
End: 2025-07-24
Attending: ANESTHESIOLOGY
Payer: MEDICARE

## 2025-07-24 VITALS
DIASTOLIC BLOOD PRESSURE: 68 MMHG | SYSTOLIC BLOOD PRESSURE: 123 MMHG | HEIGHT: 69 IN | WEIGHT: 137.81 LBS | BODY MASS INDEX: 20.41 KG/M2 | HEART RATE: 77 BPM

## 2025-07-24 DIAGNOSIS — M47.896 OTHER SPONDYLOSIS, LUMBAR REGION: ICD-10-CM

## 2025-07-24 DIAGNOSIS — S32.010A CLOSED WEDGE COMPRESSION FRACTURE OF L1 VERTEBRA, INITIAL ENCOUNTER: ICD-10-CM

## 2025-07-24 PROCEDURE — 99205 OFFICE O/P NEW HI 60 MIN: CPT | Mod: S$PBB,GC,, | Performed by: ANESTHESIOLOGY

## 2025-07-24 PROCEDURE — 99214 OFFICE O/P EST MOD 30 MIN: CPT | Mod: PBBFAC | Performed by: ANESTHESIOLOGY

## 2025-07-24 PROCEDURE — 99999 PR PBB SHADOW E&M-EST. PATIENT-LVL IV: CPT | Mod: PBBFAC,,, | Performed by: ANESTHESIOLOGY

## 2025-07-24 NOTE — PROGRESS NOTES
Chronic Pain - New Consult    PCP: No, Primary Doctor    REFERRING PHYSICIAN: Jere, Isaiahreferral    CHIEF COMPLAINT: back pain     Original HISTORY OF PRESENT ILLNESS: Meño Garcia presents to the clinic for the evaluation of the above pain. The pain started in late February after a fall. He was found to have a L1 vertebral wedge compression fracture. He has been seen with NSGY previously who had recommended kyphoplasty     Reviewed all records from [facility] at length with Dr Garcia and discussed with him all available options to compression fractures     Original Pain Description:  The pain is located in the midline and is axial in nature. The pain is described as aching and dull. Exacerbating factors: Sitting, Standing, and Walking. Mitigating factors rest and back braces. Symptoms interfere with daily activity. The patient feels like symptoms have been improving.    Original PAIN SCORES:  Best: Pain is 0 - rarely  Worst: Pain is 10  Current: Pain is 6/10        7/24/2025     1:32 PM   Last 3 PDI Scores   Pain Disability Index (PDI) 0       INTERVAL HISTORY (Date):        (Newest visit at the top)       Conservative therapy:  PT: 4/17/25 - 5/8/25 - ended up causing more pain   Chiro: N/A       Treatments / Medications: (Ice/Heat/NSAIDS/APAP/etc):  Not really. Rarely used medications       Interventional Pain Procedures: (Previous injections)  N/a       IMAGING:        Past Medical History:   Diagnosis Date    HTN (hypertension)     Patent foramen ovale     T2DM (type 2 diabetes mellitus)      Past Surgical History:   Procedure Laterality Date    OPEN REDUCTION AND INTERNAL FIXATION (ORIF) OF INJURY OF SHOULDER  2016     Social History[1]  No family history on file.    Review of patient's allergies indicates:   Allergen Reactions    Ancef [cefazolin]     Cephalosporins     Flomax [tamsulosin]      wheezing    Valsartan      Headache         Current Outpatient Medications   Medication Sig    amLODIPine (NORVASC) 5 MG  "tablet Take 1 tablet (5 mg total) by mouth once daily.    aspirin (ECOTRIN) 81 MG EC tablet 81 mg once daily.    atorvastatin (LIPITOR) 10 MG tablet 10 mg. Mon and Fri    bisacodyL (DULCOLAX, BISACODYL,) 5 mg EC tablet 5 mg once daily.    carvediloL (COREG) 6.25 MG tablet Take 6.25 mg by mouth 2 (two) times daily.    clobetasol 0.05% (TEMOVATE) 0.05 % Oint Apply 1 g topically 2 (two) times daily.    docusate sodium (COLACE) 100 MG capsule 100 mg 2 (two) times a day.    empagliflozin (JARDIANCE) 10 mg tablet Take 1 tablet (10 mg total) by mouth once daily.    ergocalciferol (ERGOCALCIFEROL) 50,000 unit Cap Take 50,000 Units by mouth every 7 days.    famotidine (PEPCID) 40 MG tablet Take 40 mg by mouth once daily.    ferrous sulfate (IRON, FERROUS SULFATE,) 325 mg (65 mg iron) Tab tablet Take 325 mg by mouth once daily.    FREESTYLE ELVER 3 PLUS SENSOR Magy Use 1 sensor for every 15 days, (2 sensors a month),  ICD10: E11.65    insulin glargine-yfgn 100 unit/mL (3 mL) InPn Inject 8 Units into the skin every evening.    losartan (COZAAR) 100 MG tablet Take 1 tablet (100 mg total) by mouth once daily.    metFORMIN (GLUCOPHAGE) 1000 MG tablet Take 1 tablet (1,000 mg total) by mouth daily with breakfast.    MOUNJARO 2.5 mg/0.5 mL PnIj Inject 2.5 mg (one pen) into the skin every 7 days.    pen needle, diabetic (BD ULTRA-FINE GEMA PEN NEEDLE) 32 gauge x 5/32" Ndle Use needles to inject insulin daily times a day. ICD-10: E11.65    tacrolimus (PROTOPIC) 0.1 % ointment Apply topically.     No current facility-administered medications for this visit.       ROS:  GENERAL: No fever. No chills. No fatigue. Denies weight loss. Denies weight gain.  HEENT: Denies headaches. Denies vision change. Denies eye pain. Denies double vision. Denies ear pain.   CV: Denies chest pain.   PULM: Denies of shortness of breath.  GI: Denies constipation. No diarrhea. No abdominal pain. Denies nausea. Denies vomiting. No blood in stool.  HEME: Denies " "bleeding problems.  : Denies urgency. No painful urination. No blood in urine.  MS: Denies joint stiffness. Denies joint swelling.   SKIN: Denies rash.   NEURO: Denies seizures. No weakness.  PSYCH:  Denies difficulty sleeping. No anxiety. Denies depression. No suicidal thoughts.       VITALS:   Vitals:    07/24/25 1334   BP: 123/68   Pulse: 77   Weight: 62.5 kg (137 lb 12.6 oz)   Height: 5' 9" (1.753 m)   PainSc:   4   PainLoc: Back         PHYSICAL EXAM:   GENERAL: Well appearing, in no acute distress, alert and oriented x3.  PSYCH:  Mood and affect appropriate.  SKIN: Skin color, texture, turgor normal, no rashes or lesions.  HEENT:  Normocephalic, atraumatic. Cranial nerves grossly intact.  PULM: No evidence of respiratory difficulty, symmetric chest rise.  GI:  Non-distended  BACK:   Limited with extension 2/2 pain. No pain with flexion/rotation   POSITIVE for severe pain to palpation over the spinous processes.  Positive axial loading test bilateral. Positive tenderness over both SIJ with positive thigh and sacral thrust test, Positive FABERE,Ganselin and Yeoman's test on the both side.negative FADIR   EXTREMITIES:   No deformities, edema, or skin discoloration.   No atrophy is noted.   NEURO: Sensation is equal and appropriate bilaterally. Bilateral lower extremity strength is normal and symmetric. Bilateral lower extremity coordination and muscle stretch reflexes are physiologic and symmetric. Straight leg raising in the seated position is Negative Bilaterally to radicular pain.   GAIT: Normal        ASSESSMENT: 85 y.o. year old with low and mid back pain, consistent with:    1. Closed wedge compression fracture of L1 vertebra, initial encounter  [facility]      2. Other spondylosis, lumbar region  Ambulatory referral/consult to [facility]        Discussed all available treatments at length and reviewed all recommended medications beenfits and side " effects, Patient was very pleased with information and let us know he will think about it       PLAN:    Patient Education:  Discussed the importance of physical therapy, activity modification, and a home exercise plan to help with pain and improve health.  Therapy referral:    Medications:   Patient can continue with pain medications for now per their provider since they are providing benefits, using them appropriately, and without side effects.  Continue tylenol as needed  Schedule pain intervention for: none at this time   Future consideration: for kyphoplasty if necessary down the line  Counseled patient: regarding the importance of activity modification and physical therapy.  Return to clinic: 6 weeks    Ibrahim Samarra'e, MD [facility]  PGY-5  60 minutes of total time was spent on this encounter, which includes face to face time and non-face to face time preparing to see the patient (eg, review of tests), Obtaining and/or reviewing separately obtained history, documenting clinical information in the electronic or other health record, independently interpreting results (not separately reported) and communicating results to the patient/family/caregiver, or care coordination (not separately reported).    I have personally reviewed the history and exam of this patient and agree with the resident/fellow/NPs note as stated above.    Hussain Hancock MD  7/24/25.           [1]   Social History  Socioeconomic History    Marital status:    Tobacco Use    Smoking status: Never    Smokeless tobacco: Never   Substance and Sexual Activity    Alcohol use: Yes     Comment: occ    Drug use: Never     Social Drivers of Health     Financial Resource Strain: Low Risk  (4/6/2025)    Overall Financial Resource Strain (CARDIA)     Difficulty of Paying Living Expenses: Not hard at all   Food Insecurity: No Food Insecurity (4/6/2025)    Hunger Vital Sign     Worried About Running Out of Food in the Last Year:  Never true     Ran Out of Food in the Last Year: Never true   Transportation Needs: No Transportation Needs (4/6/2025)    PRAPARE - Transportation     Lack of Transportation (Medical): No     Lack of Transportation (Non-Medical): No   Physical Activity: Inactive (4/6/2025)    Exercise Vital Sign     Days of Exercise per Week: 0 days     Minutes of Exercise per Session: 0 min   Stress: No Stress Concern Present (4/6/2025)    [employer] - Occupational Stress Questionnaire     Feeling of Stress : Not at all   Housing Stability: Low Risk  (4/6/2025)    Housing Stability Vital Sign     Unable to Pay for Housing in the Last Year: No     Number of Times Moved in the Last Year: 0     Homeless in the Last Year: No

## 2025-08-05 ENCOUNTER — PATIENT MESSAGE (OUTPATIENT)
Dept: NEPHROLOGY | Facility: CLINIC | Age: 85
End: 2025-08-05
Payer: MEDICARE

## 2025-08-11 ENCOUNTER — LAB VISIT (OUTPATIENT)
Dept: LAB | Facility: HOSPITAL | Age: 85
End: 2025-08-11
Attending: HOSPITALIST
Payer: MEDICARE

## 2025-08-11 DIAGNOSIS — Z79.4 TYPE 2 DIABETES MELLITUS WITH HYPERGLYCEMIA, WITH LONG-TERM CURRENT USE OF INSULIN: ICD-10-CM

## 2025-08-11 DIAGNOSIS — E11.65 TYPE 2 DIABETES MELLITUS WITH HYPERGLYCEMIA, WITH LONG-TERM CURRENT USE OF INSULIN: ICD-10-CM

## 2025-08-11 LAB
ANION GAP (OHS): 9 MMOL/L (ref 8–16)
BUN SERPL-MCNC: 16 MG/DL (ref 8–23)
CALCIUM SERPL-MCNC: 9.4 MG/DL (ref 8.7–10.5)
CHLORIDE SERPL-SCNC: 105 MMOL/L (ref 95–110)
CO2 SERPL-SCNC: 23 MMOL/L (ref 23–29)
CREAT SERPL-MCNC: 1.1 MG/DL (ref 0.5–1.4)
EAG (OHS): 171 MG/DL (ref 68–131)
GFR SERPLBLD CREATININE-BSD FMLA CKD-EPI: >60 ML/MIN/1.73/M2
GLUCOSE SERPL-MCNC: 130 MG/DL (ref 70–110)
HBA1C MFR BLD: 7.6 % (ref 4–5.6)
POTASSIUM SERPL-SCNC: 4.3 MMOL/L (ref 3.5–5.1)
SODIUM SERPL-SCNC: 137 MMOL/L (ref 136–145)

## 2025-08-11 PROCEDURE — 36415 COLL VENOUS BLD VENIPUNCTURE: CPT | Mod: PO

## 2025-08-11 PROCEDURE — 83036 HEMOGLOBIN GLYCOSYLATED A1C: CPT

## 2025-08-11 PROCEDURE — 80048 BASIC METABOLIC PNL TOTAL CA: CPT

## 2025-08-18 ENCOUNTER — OFFICE VISIT (OUTPATIENT)
Dept: ENDOCRINOLOGY | Facility: CLINIC | Age: 85
End: 2025-08-18
Payer: MEDICARE

## 2025-08-18 VITALS
DIASTOLIC BLOOD PRESSURE: 72 MMHG | SYSTOLIC BLOOD PRESSURE: 115 MMHG | BODY MASS INDEX: 20 KG/M2 | HEART RATE: 85 BPM | WEIGHT: 135.38 LBS

## 2025-08-18 DIAGNOSIS — E11.649 HYPOGLYCEMIA ASSOCIATED WITH TYPE 2 DIABETES MELLITUS: ICD-10-CM

## 2025-08-18 DIAGNOSIS — E11.65 TYPE 2 DIABETES MELLITUS WITH HYPERGLYCEMIA, WITH LONG-TERM CURRENT USE OF INSULIN: ICD-10-CM

## 2025-08-18 DIAGNOSIS — I10 ESSENTIAL HYPERTENSION: ICD-10-CM

## 2025-08-18 DIAGNOSIS — E55.9 VITAMIN D DEFICIENCY: Primary | ICD-10-CM

## 2025-08-18 DIAGNOSIS — E11.21 DIABETIC NEPHROPATHY ASSOCIATED WITH TYPE 2 DIABETES MELLITUS: ICD-10-CM

## 2025-08-18 DIAGNOSIS — Z79.4 TYPE 2 DIABETES MELLITUS WITH HYPERGLYCEMIA, WITH LONG-TERM CURRENT USE OF INSULIN: ICD-10-CM

## 2025-08-18 PROCEDURE — 99214 OFFICE O/P EST MOD 30 MIN: CPT | Mod: S$PBB,,, | Performed by: HOSPITALIST

## 2025-08-18 PROCEDURE — 95251 CONT GLUC MNTR ANALYSIS I&R: CPT | Mod: ,,, | Performed by: HOSPITALIST

## 2025-08-18 PROCEDURE — 99214 OFFICE O/P EST MOD 30 MIN: CPT | Mod: PBBFAC | Performed by: HOSPITALIST

## 2025-08-18 PROCEDURE — G2211 COMPLEX E/M VISIT ADD ON: HCPCS | Mod: ,,, | Performed by: HOSPITALIST

## 2025-08-18 PROCEDURE — 99999 PR PBB SHADOW E&M-EST. PATIENT-LVL IV: CPT | Mod: PBBFAC,,, | Performed by: HOSPITALIST

## 2025-08-18 RX ORDER — METFORMIN HYDROCHLORIDE 1000 MG/1
1000 TABLET ORAL
Qty: 90 TABLET | Refills: 3 | Status: SHIPPED | OUTPATIENT
Start: 2025-08-18